# Patient Record
Sex: MALE | Race: WHITE
[De-identification: names, ages, dates, MRNs, and addresses within clinical notes are randomized per-mention and may not be internally consistent; named-entity substitution may affect disease eponyms.]

---

## 2019-11-13 ENCOUNTER — HOSPITAL ENCOUNTER (EMERGENCY)
Dept: HOSPITAL 95 - ER | Age: 69
Discharge: HOME | End: 2019-11-13
Payer: OTHER GOVERNMENT

## 2019-11-13 VITALS — WEIGHT: 159.84 LBS | BODY MASS INDEX: 25.09 KG/M2 | HEIGHT: 67 IN

## 2019-11-13 DIAGNOSIS — Z79.899: ICD-10-CM

## 2019-11-13 DIAGNOSIS — Z87.01: ICD-10-CM

## 2019-11-13 DIAGNOSIS — I25.10: ICD-10-CM

## 2019-11-13 DIAGNOSIS — I11.0: ICD-10-CM

## 2019-11-13 DIAGNOSIS — F17.210: ICD-10-CM

## 2019-11-13 DIAGNOSIS — G45.9: Primary | ICD-10-CM

## 2019-11-13 DIAGNOSIS — I50.32: ICD-10-CM

## 2019-11-13 LAB
ALBUMIN SERPL BCP-MCNC: 3.5 G/DL (ref 3.4–5)
ALBUMIN/GLOB SERPL: 1 {RATIO} (ref 0.8–1.8)
ALT SERPL W P-5'-P-CCNC: 48 U/L (ref 12–78)
ANION GAP SERPL CALCULATED.4IONS-SCNC: 6 MMOL/L (ref 6–16)
AST SERPL W P-5'-P-CCNC: 21 U/L (ref 12–37)
BASOPHILS # BLD AUTO: 0.05 K/MM3 (ref 0–0.23)
BASOPHILS NFR BLD AUTO: 1 % (ref 0–2)
BILIRUB SERPL-MCNC: 0.7 MG/DL (ref 0.1–1)
BUN SERPL-MCNC: 22 MG/DL (ref 8–24)
CALCIUM SERPL-MCNC: 9.4 MG/DL (ref 8.5–10.1)
CHLORIDE SERPL-SCNC: 112 MMOL/L (ref 98–108)
CO2 SERPL-SCNC: 23 MMOL/L (ref 21–32)
CREAT SERPL-MCNC: 1.14 MG/DL (ref 0.6–1.2)
DEPRECATED RDW RBC AUTO: 55.3 FL (ref 35.1–46.3)
EOSINOPHIL # BLD AUTO: 0.11 K/MM3 (ref 0–0.68)
EOSINOPHIL NFR BLD AUTO: 1 % (ref 0–6)
ERYTHROCYTE [DISTWIDTH] IN BLOOD BY AUTOMATED COUNT: 15.1 % (ref 11.7–14.2)
GLOBULIN SER CALC-MCNC: 3.6 G/DL (ref 2.2–4)
GLUCOSE SERPL-MCNC: 103 MG/DL (ref 70–99)
HCT VFR BLD AUTO: 44.5 % (ref 37–53)
HGB BLD-MCNC: 14.4 G/DL (ref 13.5–17.5)
IMM GRANULOCYTES # BLD AUTO: 0.03 K/MM3 (ref 0–0.1)
IMM GRANULOCYTES NFR BLD AUTO: 0 % (ref 0–1)
LYMPHOCYTES # BLD AUTO: 1.25 K/MM3 (ref 0.84–5.2)
LYMPHOCYTES NFR BLD AUTO: 12 % (ref 21–46)
MCHC RBC AUTO-ENTMCNC: 32.4 G/DL (ref 31.5–36.5)
MCV RBC AUTO: 99 FL (ref 80–100)
MONOCYTES # BLD AUTO: 1.18 K/MM3 (ref 0.16–1.47)
MONOCYTES NFR BLD AUTO: 11 % (ref 4–13)
NEUTROPHILS # BLD AUTO: 7.88 K/MM3 (ref 1.96–9.15)
NEUTROPHILS NFR BLD AUTO: 75 % (ref 41–73)
NRBC # BLD AUTO: 0 K/MM3 (ref 0–0.02)
NRBC BLD AUTO-RTO: 0 /100 WBC (ref 0–0.2)
PLATELET # BLD AUTO: 272 K/MM3 (ref 150–400)
POTASSIUM SERPL-SCNC: 4.5 MMOL/L (ref 3.5–5.5)
PROT SERPL-MCNC: 7.1 G/DL (ref 6.4–8.2)
PROTHROMBIN TIME: 11.4 SEC (ref 9.7–11.5)
SODIUM SERPL-SCNC: 141 MMOL/L (ref 136–145)

## 2020-01-02 ENCOUNTER — HOSPITAL ENCOUNTER (INPATIENT)
Dept: HOSPITAL 95 - ER | Age: 70
LOS: 3 days | Discharge: HOME | DRG: 310 | End: 2020-01-05
Attending: INTERNAL MEDICINE | Admitting: INTERNAL MEDICINE
Payer: OTHER GOVERNMENT

## 2020-01-02 VITALS — WEIGHT: 160.94 LBS | HEIGHT: 67.01 IN | BODY MASS INDEX: 25.26 KG/M2

## 2020-01-02 DIAGNOSIS — I48.19: Primary | ICD-10-CM

## 2020-01-02 DIAGNOSIS — Z95.810: ICD-10-CM

## 2020-01-02 DIAGNOSIS — Z79.82: ICD-10-CM

## 2020-01-02 DIAGNOSIS — Z95.5: ICD-10-CM

## 2020-01-02 DIAGNOSIS — I11.0: ICD-10-CM

## 2020-01-02 DIAGNOSIS — I25.5: ICD-10-CM

## 2020-01-02 DIAGNOSIS — E78.5: ICD-10-CM

## 2020-01-02 DIAGNOSIS — I50.9: ICD-10-CM

## 2020-01-02 DIAGNOSIS — I25.10: ICD-10-CM

## 2020-01-02 DIAGNOSIS — F17.210: ICD-10-CM

## 2020-01-02 DIAGNOSIS — J44.9: ICD-10-CM

## 2020-01-02 LAB
ALBUMIN SERPL BCP-MCNC: 4 G/DL (ref 3.4–5)
ALBUMIN/GLOB SERPL: 1 {RATIO} (ref 0.8–1.8)
ALT SERPL W P-5'-P-CCNC: 61 U/L (ref 12–78)
ANION GAP SERPL CALCULATED.4IONS-SCNC: 9 MMOL/L (ref 6–16)
AST SERPL W P-5'-P-CCNC: 30 U/L (ref 12–37)
BASOPHILS # BLD AUTO: 0.05 K/MM3 (ref 0–0.23)
BASOPHILS NFR BLD AUTO: 1 % (ref 0–2)
BILIRUB SERPL-MCNC: 1.1 MG/DL (ref 0.1–1)
BUN SERPL-MCNC: 36 MG/DL (ref 8–24)
CALCIUM SERPL-MCNC: 9.6 MG/DL (ref 8.5–10.1)
CHLORIDE SERPL-SCNC: 115 MMOL/L (ref 98–108)
CO2 SERPL-SCNC: 19 MMOL/L (ref 21–32)
CREAT SERPL-MCNC: 1.14 MG/DL (ref 0.6–1.2)
DEPRECATED RDW RBC AUTO: 49.7 FL (ref 35.1–46.3)
EOSINOPHIL # BLD AUTO: 0.15 K/MM3 (ref 0–0.68)
EOSINOPHIL NFR BLD AUTO: 1 % (ref 0–6)
ERYTHROCYTE [DISTWIDTH] IN BLOOD BY AUTOMATED COUNT: 14.1 % (ref 11.7–14.2)
GLOBULIN SER CALC-MCNC: 3.9 G/DL (ref 2.2–4)
GLUCOSE SERPL-MCNC: 85 MG/DL (ref 70–99)
HCT VFR BLD AUTO: 42.8 % (ref 37–53)
HGB BLD-MCNC: 13.9 G/DL (ref 13.5–17.5)
IMM GRANULOCYTES # BLD AUTO: 0.04 K/MM3 (ref 0–0.1)
IMM GRANULOCYTES NFR BLD AUTO: 0 % (ref 0–1)
LYMPHOCYTES # BLD AUTO: 1.71 K/MM3 (ref 0.84–5.2)
LYMPHOCYTES NFR BLD AUTO: 15 % (ref 21–46)
MAGNESIUM SERPL-MCNC: 2.2 MG/DL (ref 1.6–2.4)
MCHC RBC AUTO-ENTMCNC: 32.5 G/DL (ref 31.5–36.5)
MCV RBC AUTO: 96 FL (ref 80–100)
MONOCYTES # BLD AUTO: 1.49 K/MM3 (ref 0.16–1.47)
MONOCYTES NFR BLD AUTO: 13 % (ref 4–13)
NEUTROPHILS # BLD AUTO: 7.65 K/MM3 (ref 1.96–9.15)
NEUTROPHILS NFR BLD AUTO: 69 % (ref 41–73)
NRBC # BLD AUTO: 0 K/MM3 (ref 0–0.02)
NRBC BLD AUTO-RTO: 0 /100 WBC (ref 0–0.2)
PLATELET # BLD AUTO: 208 K/MM3 (ref 150–400)
POTASSIUM SERPL-SCNC: 4.8 MMOL/L (ref 3.5–5.5)
PROT SERPL-MCNC: 7.9 G/DL (ref 6.4–8.2)
PROTHROMBIN TIME: 26.7 SEC (ref 9.7–11.5)
SODIUM SERPL-SCNC: 143 MMOL/L (ref 136–145)
TSH SERPL DL<=0.005 MIU/L-ACNC: 1.86 UIU/ML (ref 0.36–4.8)
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
## 2020-01-03 LAB
ANION GAP SERPL CALCULATED.4IONS-SCNC: 8 MMOL/L (ref 6–16)
BASOPHILS # BLD AUTO: 0.04 K/MM3 (ref 0–0.23)
BASOPHILS NFR BLD AUTO: 0 % (ref 0–2)
BUN SERPL-MCNC: 42 MG/DL (ref 8–24)
CALCIUM SERPL-MCNC: 9.3 MG/DL (ref 8.5–10.1)
CHLORIDE SERPL-SCNC: 114 MMOL/L (ref 98–108)
CO2 SERPL-SCNC: 17 MMOL/L (ref 21–32)
CREAT SERPL-MCNC: 1.15 MG/DL (ref 0.6–1.2)
DEPRECATED RDW RBC AUTO: 49.3 FL (ref 35.1–46.3)
EOSINOPHIL # BLD AUTO: 0.15 K/MM3 (ref 0–0.68)
EOSINOPHIL NFR BLD AUTO: 1 % (ref 0–6)
ERYTHROCYTE [DISTWIDTH] IN BLOOD BY AUTOMATED COUNT: 14.2 % (ref 11.7–14.2)
GLUCOSE SERPL-MCNC: 102 MG/DL (ref 70–99)
HCT VFR BLD AUTO: 39.9 % (ref 37–53)
HGB BLD-MCNC: 12.9 G/DL (ref 13.5–17.5)
IMM GRANULOCYTES # BLD AUTO: 0.06 K/MM3 (ref 0–0.1)
IMM GRANULOCYTES NFR BLD AUTO: 1 % (ref 0–1)
LYMPHOCYTES # BLD AUTO: 1.66 K/MM3 (ref 0.84–5.2)
LYMPHOCYTES NFR BLD AUTO: 15 % (ref 21–46)
MCHC RBC AUTO-ENTMCNC: 32.3 G/DL (ref 31.5–36.5)
MCV RBC AUTO: 96 FL (ref 80–100)
MONOCYTES # BLD AUTO: 1.51 K/MM3 (ref 0.16–1.47)
MONOCYTES NFR BLD AUTO: 13 % (ref 4–13)
NEUTROPHILS # BLD AUTO: 7.98 K/MM3 (ref 1.96–9.15)
NEUTROPHILS NFR BLD AUTO: 70 % (ref 41–73)
NRBC # BLD AUTO: 0 K/MM3 (ref 0–0.02)
NRBC BLD AUTO-RTO: 0 /100 WBC (ref 0–0.2)
PLATELET # BLD AUTO: 205 K/MM3 (ref 150–400)
POTASSIUM SERPL-SCNC: 4.9 MMOL/L (ref 3.5–5.5)
PROTHROMBIN TIME: 32.5 SEC (ref 9.7–11.5)
SODIUM SERPL-SCNC: 139 MMOL/L (ref 136–145)

## 2020-01-03 PROCEDURE — 5A2204Z RESTORATION OF CARDIAC RHYTHM, SINGLE: ICD-10-PCS

## 2020-01-03 NOTE — NUR
CALL TO MD / LOOSE STOOLS
 
CALL TO MD STRONG TO REPORT LOOSE/LIQUIDY, BROWN STOOLS THIS SHIFT. MD BERRY/ ORDER
FOR GI PANEL TO BE COLLECTED.

## 2020-01-03 NOTE — NUR
SHIFT SUMMARY,
 
PT HAD CARDIOVERSION AT APROX 1400. PT CONVERTED TO SNR AND HAS STAYED SO FAR.
PER DR. MOCK THE AMNIO GTT IS TO CONTINUE TO RUN UNTIL HE SEES THE PT IN
THE AM. PT TOLERATED CARDIOVERSION WELL. PT HAS BEEN HYPOTENSIVE, PROVIDER IS
AWARE. PT'S OTHER VS HAVE BEEN STABLE.
 
CALL LIGHT IN REACH, WILL CONTINUE TO MONITOR UNTIL REPORT IS GIVEN TO
ONCOMING RN.
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
## 2020-01-03 NOTE — NUR
AM NOTE...
 
ASSUMED CARE OF PT APROX 0700. PT IS A&Ox4 AND IND IN THE ROOM. PT WAS
ADMITTED FOR AFIB RVR. PT C/O OF SOB ON ADMIT, HOWEVER PT STATES THIS HAS
IMPROVED GREATLY. CURRENTLY PT IS IN AFIB . PT IS ON AMNIO GTT THAT WILL
END AT 1625. L/S CLEAR T/O DIM IN THE BASES. BT PRESENT AND NOMROACTIVE. NO
EDEMA IS NOTED ON ASSESSMENT.  VS STABLE.
 
CALL LIGHT IN REACH, WILL CONTINUE TO MONITOR.

## 2020-01-03 NOTE — NUR
SOCIAL SERVICE CONSULT / NO TEETH
 
SOCIAL SERVICE CONSULT PLACED D/T PT HAVING NO TEETH AND REPORTS NO DENTAL
COVERAGE FOR DENTURES. PT REPORTS SOMETIMES GETTING A SORE MOUTH WHEN
ATTEMPTING TO EAT SOME SOLID FOODS.

## 2020-01-03 NOTE — NUR
Advance directive education/spiritual care visit conducted. Upon receiving an
admit referral for advance directive education, I visited patient. Patient
expresses interest so I give patient the advance directive booklet and go
through the sections, discuss the importance and process and explain how to
file. Patient voices understanding and states that he will go through the
booklet on his own time.
We then talk about patient's spiritual background, his sources of inspiration,
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
I listen empathically, conduct a spiritual assessment, reinforce helpful
attitudes and practices and provide companionship, pastoral  and
prayer. Patient responds well and shows signs of restored andree. I will
continue to remain available to patient and family.

## 2020-01-03 NOTE — NUR
LOOSE STOOL
 
PT W/ 2 REPORTED LOOSE BM's SINCE ADMIT TO PCU THIS SHIFT. PT REPORTS STOOL AS
BEING "BROWN, KIND OF FORMED, KIND OF NOT. KIND OF LOOSE, KIND OF LIQUIDY."
ONE STOOL ASSESSED AS BEING LOOSE, BROWN STOOL W/ CHUNKS OF UNDIGESTED CORN.
 
PT REPORTS HAVING NO TEETH AND NOT HAVING THE ABILITY TO GET DENTURES D/T LACK
[facility]RU [facility]. PT STATES "I DO THE BEST I CAN" W/ CHEWING & EATING.
 
WILL CONTINUE TO MONITOR AND PROVIDE CARE.

## 2020-01-04 LAB — PROTHROMBIN TIME: 39.7 SEC (ref 9.7–11.5)

## 2020-01-04 NOTE — NUR
SHIFT SUMMARY
NO ACUTE CHANGES SINCE INITIAL ASSESSMENT, PATIENT DENIES PAIN AND WAS
COMPLIANT AND COOPERATIVE WITH ALL INTERVENTIONS. PATIENT CONTINUES TO EXHIBIT
HYPOTENSION WITH BP'S IN 80'S OVER 50'S, BUT MAP IS CONSISTENTLY OVER 70.
AMIODARONE DRIP HAS BEEN INFUSING PER ORDER AT 16.6 ML/HR PER ORDER OF DR MOCK, TO CONTINUE UNTIL HE SEES THE PATIENT EARLY THIS MORNING. HEART RATE
CONTINUES TO BE PACED AT 60 BPM, PER MONITOR. WILL CONTINUE TO MONITOR AND
WILL PASS CARE AND REPORT TO ONCOMING SHIFT AT 0700. PATIENT SLEEPING IN SHORT
STINTS AS HE REPORTS HE IS NOT USED TO SLEEPING IN HOSPITAL. BED IS LOCKED AND
LOW, CALL LIGHT W/IN REACH

## 2020-01-04 NOTE — NUR
ASSUMED CARE OF PATIENT AT 1900 W/ REPORT FROM GLADYS PIKE, PATIENT AWAKE AND
ALERT SITTING SIDE OF BED IN NO OBVIOUS DISTRESS. ALL VSS AND WNL, HEART RATE
PACED AT 60 PER MONITOR TECH. LUNG SOUNDS COARSE WITH GOOD AIR MOVEMENT. AT
ASSESSMENT PATIENT ADMITTED CHRONIC PAIN TO BACK AND NECK AT 8/10. AS THERE IS
NO PAIN MEDICATION IN PT'S EMAR, WILL CALL HOSPITALIST TO OBTAIN ORDER FOR
TRAMADOL WHICH WORKED FOR THE PATIENT THE NIGHT PREVIOUS. WILL CONTINUE TO
MONITOR AND PROVIDE CARE.

## 2020-01-04 NOTE — NUR
CALLED HOSPITALIST AT PT REQUEST FOR PAIN MEDICATION AND OBTAINED ONE-TIME
ORDER FOR TRAMADOL 100MG, EQUAL TO WHAT PATIENT HAD THE NIGHT PREVIOUS. WILL
ADMINISTER AND CONTINUE TO ASSESS AND MONITOR

## 2020-01-04 NOTE — NUR
PT HAS BEEN RESTING WELL IN BED T/O THE DAY. DENIES CP OR SOB. PT HAS BEEN
TAKEN OFF OF AMIODERONE DRIP AND PLACED ON PO. SLIGHT HYPOTENSION IS STILL
NOTED BUT HAS NOT CHANGED FROM LAST NOC. PT A/O X ANSWERING QUESTIONS
APPROPRIATELY IN FULL SENTENCES. PT HAS BEEN INDEPENDANT IN ROM

## 2020-01-04 NOTE — NUR
ASSUMED CARE OF PATIENT AT 1900 WITH REPORT FROM DAY NURSE, PATIENT LYING IN
BED EYES CLOSED BREATHING DEEPLY. AT ASSESSMENT PATIENT IS ALERT AND
COOPERATIVE, DENIES ACUTE DISCOMFORT BUT C/O CHRONIC NECK/BACK PAIN. PATIENT
HYPOTENSIVE CONSISTENT W/ REPORT, AND ASYMPTOMATIC. ALL OTHER VSS AND WNL.
SATURATING 94% ON ROOM AIR, LUNGS CLEAR, HR PACED PER MONITOR AT 60. WILL
CONTINUE TO MONITOR, CALL LIGHT W/IN REACH.

## 2020-01-05 LAB — PROTHROMBIN TIME: 27.1 SEC (ref 9.7–11.5)

## 2020-01-05 NOTE — NUR
PT D/C
 
PT D/C HOME, MEDCIATIONS CALLED TO PHARMACY OF CHOICE, D/C EDUCATION PROVIDED.
IV WAS REMOVED WNL. ALL OF PT'S BELONGINGS PACKED AND SENT WITH PT.

## 2020-01-05 NOTE — NUR
AM NOTE...
 
ASSUMED CARE OF PT APROX 0700. PT IS A&Ox4 AND IND IN THE ROOM. PT IS S/P
CARDIOVERSION FROM AFIB TO NSR/PACED. PT IS HYPOTENSIVE AT 87/55, PT IS NOT
SYMPTOMATIC AT THIS TIME. CARDIOLOGY IS AWARE. PT'S OTHER VS STABLE. PT DENIES
CHEST PAIN/PRESSURE N/V OR SOB.
 
WILL CONTINUE TO MONITOR.

## 2020-01-05 NOTE — NUR
SHIFT SUMMARY
NO ACUTE CHANGES FROM INITIAL NOTE. PATIENT WAS MEDICATED AND TREATED PER EMAR
AND UNIT PROTOCOL WITH NO SIGNIFICANT ISSUES. PATIENT REMAINS HYPTOTENSIVE AS
BASELINE, AND HEART RATE REMAINED NEAR 60 BPM THROUGHOUT SHIFT.
PATIENT WAS UP AMBULATING IN THE HALLS TWICE THIS SHIFT, WHICH HE TOLERATED
WELL, EXHIBITING ONLY MILD SOB WHEN RETURNING TO ROOM. OTHERWISE PT ALTERNATED
BETWEEN WATCHING TV AND PERIODS OF RESTING/SLEEPING IN BED, AND WAS COMPLIANT
WITH ALL INTERVENTIONS. WILL CONTINUE TO MONITOR UNTIL PASSING CARE AND REPORT
TO ONCOMING SHIFT. BED IS LOCKED AND LOW, CALL LIGHT W/IN REACH

## 2020-09-11 ENCOUNTER — HOSPITAL ENCOUNTER (INPATIENT)
Dept: HOSPITAL 95 - ER | Age: 70
LOS: 7 days | Discharge: HOME HEALTH | DRG: 280 | End: 2020-09-18
Attending: INTERNAL MEDICINE | Admitting: HOSPITALIST
Payer: OTHER GOVERNMENT

## 2020-09-11 VITALS — WEIGHT: 147.49 LBS | BODY MASS INDEX: 23.7 KG/M2 | HEIGHT: 65.98 IN

## 2020-09-11 DIAGNOSIS — J44.1: ICD-10-CM

## 2020-09-11 DIAGNOSIS — N18.2: ICD-10-CM

## 2020-09-11 DIAGNOSIS — I25.5: ICD-10-CM

## 2020-09-11 DIAGNOSIS — N17.9: ICD-10-CM

## 2020-09-11 DIAGNOSIS — I13.0: Primary | ICD-10-CM

## 2020-09-11 DIAGNOSIS — I48.0: ICD-10-CM

## 2020-09-11 DIAGNOSIS — I21.A1: ICD-10-CM

## 2020-09-11 DIAGNOSIS — K21.9: ICD-10-CM

## 2020-09-11 DIAGNOSIS — F17.213: ICD-10-CM

## 2020-09-11 DIAGNOSIS — F17.210: ICD-10-CM

## 2020-09-11 DIAGNOSIS — I50.43: ICD-10-CM

## 2020-09-11 DIAGNOSIS — Z95.810: ICD-10-CM

## 2020-09-11 DIAGNOSIS — Z86.73: ICD-10-CM

## 2020-09-11 DIAGNOSIS — M19.90: ICD-10-CM

## 2020-09-11 DIAGNOSIS — Z79.82: ICD-10-CM

## 2020-09-11 DIAGNOSIS — E78.5: ICD-10-CM

## 2020-09-11 DIAGNOSIS — E87.6: ICD-10-CM

## 2020-09-11 DIAGNOSIS — I95.9: ICD-10-CM

## 2020-09-11 DIAGNOSIS — I25.10: ICD-10-CM

## 2020-09-11 LAB
ALBUMIN SERPL BCP-MCNC: 3.2 G/DL (ref 3.4–5)
ALBUMIN/GLOB SERPL: 0.8 {RATIO} (ref 0.8–1.8)
ALT SERPL W P-5'-P-CCNC: 12 U/L (ref 12–78)
ANION GAP SERPL CALCULATED.4IONS-SCNC: 9 MMOL/L (ref 6–16)
AST SERPL W P-5'-P-CCNC: 14 U/L (ref 12–37)
BASOPHILS # BLD AUTO: 0.07 K/MM3 (ref 0–0.23)
BASOPHILS NFR BLD AUTO: 1 % (ref 0–2)
BILIRUB SERPL-MCNC: 0.8 MG/DL (ref 0.1–1)
BUN SERPL-MCNC: 20 MG/DL (ref 8–24)
CALCIUM SERPL-MCNC: 8.8 MG/DL (ref 8.5–10.1)
CHLORIDE SERPL-SCNC: 110 MMOL/L (ref 98–108)
CO2 SERPL-SCNC: 23 MMOL/L (ref 21–32)
CREAT SERPL-MCNC: 1.21 MG/DL (ref 0.6–1.2)
DEPRECATED RDW RBC AUTO: 50.9 FL (ref 35.1–46.3)
EOSINOPHIL # BLD AUTO: 0.09 K/MM3 (ref 0–0.68)
EOSINOPHIL NFR BLD AUTO: 1 % (ref 0–6)
ERYTHROCYTE [DISTWIDTH] IN BLOOD BY AUTOMATED COUNT: 14.3 % (ref 11.7–14.2)
GLOBULIN SER CALC-MCNC: 3.8 G/DL (ref 2.2–4)
GLUCOSE SERPL-MCNC: 103 MG/DL (ref 70–99)
HCT VFR BLD AUTO: 39.3 % (ref 37–53)
HGB BLD-MCNC: 12.4 G/DL (ref 13.5–17.5)
IMM GRANULOCYTES # BLD AUTO: 0.05 K/MM3 (ref 0–0.1)
IMM GRANULOCYTES NFR BLD AUTO: 0 % (ref 0–1)
LEUKOCYTE ESTERASE UR QL STRIP: (no result)
LYMPHOCYTES # BLD AUTO: 1.21 K/MM3 (ref 0.84–5.2)
LYMPHOCYTES NFR BLD AUTO: 10 % (ref 21–46)
MCHC RBC AUTO-ENTMCNC: 31.6 G/DL (ref 31.5–36.5)
MCV RBC AUTO: 97 FL (ref 80–100)
MONOCYTES # BLD AUTO: 1.19 K/MM3 (ref 0.16–1.47)
MONOCYTES NFR BLD AUTO: 10 % (ref 4–13)
NEUTROPHILS # BLD AUTO: 9.34 K/MM3 (ref 1.96–9.15)
NEUTROPHILS NFR BLD AUTO: 78 % (ref 41–73)
NRBC # BLD AUTO: 0 K/MM3 (ref 0–0.02)
NRBC BLD AUTO-RTO: 0 /100 WBC (ref 0–0.2)
PLATELET # BLD AUTO: 181 K/MM3 (ref 150–400)
POTASSIUM SERPL-SCNC: 3.5 MMOL/L (ref 3.5–5.5)
PROT SERPL-MCNC: 7 G/DL (ref 6.4–8.2)
PROT UR STRIP-MCNC: (no result) MG/DL
PROTHROMBIN TIME: 11.3 SEC (ref 9.7–11.5)
RBC #/AREA URNS HPF: (no result) /HPF (ref 0–2)
SODIUM SERPL-SCNC: 142 MMOL/L (ref 136–145)
SP GR SPEC: 1.01 (ref 1–1.02)
TROPONIN I SERPL-MCNC: 0.05 NG/ML (ref 0–0.04)
TSH SERPL DL<=0.005 MIU/L-ACNC: 1.3 UIU/ML (ref 0.36–4.8)
UROBILINOGEN UR STRIP-MCNC: (no result) MG/DL
WBC #/AREA URNS HPF: (no result) /HPF (ref 0–5)

## 2020-09-11 PROCEDURE — G0378 HOSPITAL OBSERVATION PER HR: HCPCS

## 2020-09-11 PROCEDURE — A9270 NON-COVERED ITEM OR SERVICE: HCPCS

## 2020-09-11 PROCEDURE — C9113 INJ PANTOPRAZOLE SODIUM, VIA: HCPCS

## 2020-09-12 LAB
ALBUMIN SERPL BCP-MCNC: 3.3 G/DL (ref 3.4–5)
ALBUMIN/GLOB SERPL: 0.9 {RATIO} (ref 0.8–1.8)
ALT SERPL W P-5'-P-CCNC: 15 U/L (ref 12–78)
ANION GAP SERPL CALCULATED.4IONS-SCNC: 8 MMOL/L (ref 6–16)
AST SERPL W P-5'-P-CCNC: 12 U/L (ref 12–37)
BASOPHILS # BLD AUTO: 0.07 K/MM3 (ref 0–0.23)
BASOPHILS NFR BLD AUTO: 1 % (ref 0–2)
BILIRUB SERPL-MCNC: 0.9 MG/DL (ref 0.1–1)
BUN SERPL-MCNC: 22 MG/DL (ref 8–24)
CALCIUM SERPL-MCNC: 9.1 MG/DL (ref 8.5–10.1)
CHLORIDE SERPL-SCNC: 109 MMOL/L (ref 98–108)
CO2 SERPL-SCNC: 24 MMOL/L (ref 21–32)
CREAT SERPL-MCNC: 1.4 MG/DL (ref 0.6–1.2)
DEPRECATED RDW RBC AUTO: 49.7 FL (ref 35.1–46.3)
EOSINOPHIL # BLD AUTO: 0.19 K/MM3 (ref 0–0.68)
EOSINOPHIL NFR BLD AUTO: 2 % (ref 0–6)
ERYTHROCYTE [DISTWIDTH] IN BLOOD BY AUTOMATED COUNT: 14.3 % (ref 11.7–14.2)
GLOBULIN SER CALC-MCNC: 3.6 G/DL (ref 2.2–4)
GLUCOSE SERPL-MCNC: 121 MG/DL (ref 70–99)
HCT VFR BLD AUTO: 40.2 % (ref 37–53)
HGB BLD-MCNC: 12.9 G/DL (ref 13.5–17.5)
IMM GRANULOCYTES # BLD AUTO: 0.01 K/MM3 (ref 0–0.1)
IMM GRANULOCYTES NFR BLD AUTO: 0 % (ref 0–1)
LYMPHOCYTES # BLD AUTO: 1.92 K/MM3 (ref 0.84–5.2)
LYMPHOCYTES NFR BLD AUTO: 24 % (ref 21–46)
MCHC RBC AUTO-ENTMCNC: 32.1 G/DL (ref 31.5–36.5)
MCV RBC AUTO: 96 FL (ref 80–100)
MONOCYTES # BLD AUTO: 0.55 K/MM3 (ref 0.16–1.47)
MONOCYTES NFR BLD AUTO: 7 % (ref 4–13)
NEUTROPHILS # BLD AUTO: 5.41 K/MM3 (ref 1.96–9.15)
NEUTROPHILS NFR BLD AUTO: 66 % (ref 41–73)
NRBC # BLD AUTO: 0 K/MM3 (ref 0–0.02)
NRBC BLD AUTO-RTO: 0 /100 WBC (ref 0–0.2)
PLATELET # BLD AUTO: 209 K/MM3 (ref 150–400)
POTASSIUM SERPL-SCNC: 2.8 MMOL/L (ref 3.5–5.5)
PROT SERPL-MCNC: 6.9 G/DL (ref 6.4–8.2)
SODIUM SERPL-SCNC: 141 MMOL/L (ref 136–145)

## 2020-09-12 NOTE — NUR
PT SUMMARY:
PT ALERT AND ORIENTED, VITALS HRR PACED AT 70'S, BP SYSTOLIC 90'S-100'S MAP 77
SATS ABOVE 95% ON ROOMAIR, AFEBRILE. DENIES CHEST PAIN/PRESSURE. TROPONIN
TRENDS LAST WAS 0.7 CARDIOLOGIST CONSULTED. NEW MEDICATIONS ORDERED PT TO
START ON XARELTO FOR AFIB. NO ACUTE CHANGE REPORTED FOR THE SHIFT, PT
AMBULATED AROUNF THE UNIT WITH NO ISSUES, WORKED WITH THERAPY AS WELL.
POTASSIUM REPLACED GIVEN 60MEQ TOTAL PO TODAY TO REPEAT LAB THIS AFTERNOON.
PT IN BED RESTING, ABLE TO MAKE NEEDS KNOWN, CALL LIGHTS WITHIN REACH WILL
MONITOR

## 2020-09-12 NOTE — NUR
LOW BLOOD PRESSURE
 
PT FOUND TO HAVE A LOW BP. PT DENIES LIGHT HEADEDNESS, DIZZINESS.  PT IS
ASYMPTOMATIC. MAP >60. WILL CONTINUE TO MONITOR.

## 2020-09-13 LAB
ANION GAP SERPL CALCULATED.4IONS-SCNC: 5 MMOL/L (ref 6–16)
BUN SERPL-MCNC: 34 MG/DL (ref 8–24)
CALCIUM SERPL-MCNC: 9 MG/DL (ref 8.5–10.1)
CHLORIDE SERPL-SCNC: 111 MMOL/L (ref 98–108)
CO2 SERPL-SCNC: 24 MMOL/L (ref 21–32)
CREAT SERPL-MCNC: 1.59 MG/DL (ref 0.6–1.2)
GLUCOSE SERPL-MCNC: 81 MG/DL (ref 70–99)
MAGNESIUM SERPL-MCNC: 2.1 MG/DL (ref 1.6–2.4)
POTASSIUM SERPL-SCNC: 4 MMOL/L (ref 3.5–5.5)
SODIUM SERPL-SCNC: 140 MMOL/L (ref 136–145)

## 2020-09-13 NOTE — NUR
PT TRANSFERREED TO ROOM FROM PCU AT 1445.  PLEASANT COOP . SETTLED TO BED.
REPORT FROM ELEANOR PIKE.

## 2020-09-13 NOTE — NUR
PT HRR WENT UP 'S-140'S PACED, DR MOLINA AWARE ORDERED DIG LOAD, BP
SYSTOLIC 115'S-120'S, SATS ABOVE 98% ON ROOMAIR, AFEBRILE, PT DENIES ANY CHEST
PAIN/PALPITATIONS. O.5MG DIGOXIN ADMINISTERED IN 50MLS NS HR RANGING 'S
POST ADMINISTRATION. NO OTHER ISSUES ENCOUNTERED. PT TRANSFERRED TO MEDICAL
FLOOR. REPORT GIVEN TO DAREN PIKE, ALL BELONGINGS SENT WITH PT.

## 2020-09-13 NOTE — NUR
PT PLEASANT SINCE TRANSFER AT 1445. NO CONCERNS AT THIS TIME. WILL BE RUNNING
DIGOXIN SHORTLY. PT AMBULATES SBA IN ROOM. BED INLOW  POSITIOIN, CALL LITE IN
REACH, CALLS APPROP

## 2020-09-13 NOTE — NUR
ASSUME CARE:
PT HRR REMAINED PACED ON THE 70'S, BP SYSTOLIC 100'S PT WAS ABLE TO TAKE HIS
COREG, AND DIURETICS BP WAS RECHECKED AFTER AN HOUR OF MEDS RECEIVED BP WAS AT
97/77, PT DID C/O OF MILD DIZZINESS AFTER AMBULATION WAS ABLE TO AMBULATE IN
THE UNIT. PT HAD 700MLS URINE OUTPUT SINCE THE BEGINNING OF THE SHIFT. ALSO
STARTED TO DEVELOP COUGH AND BROWN PHLEGM, MUCINEX ORDERED. NO OTHER ISSUES
ENCOUNTERED FOR THE SHIFT, STATUS CHANGED TO MEDICAL WITH TELE. WILL MONITOR
PT

## 2020-09-13 NOTE — NUR
END OF SHIFT SUMMARY
 
NO ACUTE CHANGES THIS SHIFT. PT IS A&O X4. SP02>92% ON RA.
RESPIRATORY THERAPY ASSISTED PT WITH INHALOR AT THE BEGINNING OF
SHIFT. C/O SOB UPON EXERTION. OCCASIONAL NON PRODUCTIVE COUGH. AICD IN LEFT
CHEST WALL. DENIES CP/PRESSURE.  PT % VENTRICULARLY PACED HR 70'S. HAD
ONE LOW BP THIS SHIFT, BP NOW IMPROVED. PT INDEPENDENT IN ROOM.  SHOWERED THIS
SHIFT AND SLEPT T/O THE NIGHT. CALL LIGHT IN REACH. WILL CONTINUE TO MONITOR
UNTIL END OF SHIFT.

## 2020-09-14 LAB
ALBUMIN SERPL BCP-MCNC: 3 G/DL (ref 3.4–5)
ALBUMIN/GLOB SERPL: 0.8 {RATIO} (ref 0.8–1.8)
ALT SERPL W P-5'-P-CCNC: 13 U/L (ref 12–78)
ANION GAP SERPL CALCULATED.4IONS-SCNC: 8 MMOL/L (ref 6–16)
AST SERPL W P-5'-P-CCNC: 11 U/L (ref 12–37)
BASOPHILS # BLD AUTO: 0.04 K/MM3 (ref 0–0.23)
BASOPHILS NFR BLD AUTO: 0 % (ref 0–2)
BILIRUB SERPL-MCNC: 0.4 MG/DL (ref 0.1–1)
BUN SERPL-MCNC: 31 MG/DL (ref 8–24)
CALCIUM SERPL-MCNC: 9.3 MG/DL (ref 8.5–10.1)
CHLORIDE SERPL-SCNC: 108 MMOL/L (ref 98–108)
CO2 SERPL-SCNC: 24 MMOL/L (ref 21–32)
CREAT SERPL-MCNC: 1.45 MG/DL (ref 0.6–1.2)
DEPRECATED RDW RBC AUTO: 49.1 FL (ref 35.1–46.3)
EOSINOPHIL # BLD AUTO: 0.16 K/MM3 (ref 0–0.68)
EOSINOPHIL NFR BLD AUTO: 1 % (ref 0–6)
ERYTHROCYTE [DISTWIDTH] IN BLOOD BY AUTOMATED COUNT: 14.1 % (ref 11.7–14.2)
GLOBULIN SER CALC-MCNC: 3.6 G/DL (ref 2.2–4)
GLUCOSE SERPL-MCNC: 83 MG/DL (ref 70–99)
HCT VFR BLD AUTO: 37.1 % (ref 37–53)
HGB BLD-MCNC: 11.8 G/DL (ref 13.5–17.5)
IMM GRANULOCYTES # BLD AUTO: 0.03 K/MM3 (ref 0–0.1)
IMM GRANULOCYTES NFR BLD AUTO: 0 % (ref 0–1)
LYMPHOCYTES # BLD AUTO: 1.51 K/MM3 (ref 0.84–5.2)
LYMPHOCYTES NFR BLD AUTO: 14 % (ref 21–46)
MAGNESIUM SERPL-MCNC: 2.2 MG/DL (ref 1.6–2.4)
MCHC RBC AUTO-ENTMCNC: 31.8 G/DL (ref 31.5–36.5)
MCV RBC AUTO: 95 FL (ref 80–100)
MONOCYTES # BLD AUTO: 1.44 K/MM3 (ref 0.16–1.47)
MONOCYTES NFR BLD AUTO: 13 % (ref 4–13)
NEUTROPHILS # BLD AUTO: 8 K/MM3 (ref 1.96–9.15)
NEUTROPHILS NFR BLD AUTO: 72 % (ref 41–73)
NRBC # BLD AUTO: 0 K/MM3 (ref 0–0.02)
NRBC BLD AUTO-RTO: 0 /100 WBC (ref 0–0.2)
PLATELET # BLD AUTO: 214 K/MM3 (ref 150–400)
POTASSIUM SERPL-SCNC: 4 MMOL/L (ref 3.5–5.5)
PROT SERPL-MCNC: 6.6 G/DL (ref 6.4–8.2)
SODIUM SERPL-SCNC: 140 MMOL/L (ref 136–145)

## 2020-09-14 NOTE — NUR
ALERT. ORIENTED. INDEPENDENT IN ROOM. HEART RATE JUMPS UP -150'S WHEN
AMBULATORY IN HALLWAY WITH  AWARE. HAS DENIED ; C.P., N/V OR SOB. ON
TELE. TM

## 2020-09-14 NOTE — NUR
NOTIFIED THAT WHEN PATIENT IS WALKING; SUCH AS, IN HALLWAY WITH P.T.,
HIS HEART RATE JUMPS UP 'S. AFTER FEW MINUTES IN ROOM HEART RATE GOES
BACK DOWN TO 70-80'S AND IS PACED. WCTM.

## 2020-09-14 NOTE — NUR
SHIFT SUMMARY
PT IS A 69 Y/O MALE, ADMITTED FOR ACUTE ON CHRONIC CHF. HE IS A&O X 4,
INDEPENDENT IN THE ROOM. NO COMPLAINTS OF ACUTE PAIN, NAUSEA OR SOB. VITAL
SIGNS STABLE. PT RECEIVED SECOND IV DIGOXIN DOSE AT HS. PT SLEPT WELL THROUGH
THE NIGHT. NO ACUTE CHANGES IN PT CONDITION NOTED. WILL CONTINUE TO MONITOR
AND TREAT PER EMAR UNTIL HAND OFF TO DAY SHIFT RN.

## 2020-09-15 LAB
ALBUMIN SERPL BCP-MCNC: 3.4 G/DL (ref 3.4–5)
ALBUMIN/GLOB SERPL: 0.9 {RATIO} (ref 0.8–1.8)
ALT SERPL W P-5'-P-CCNC: 21 U/L (ref 12–78)
ANION GAP SERPL CALCULATED.4IONS-SCNC: 7 MMOL/L (ref 6–16)
AST SERPL W P-5'-P-CCNC: 14 U/L (ref 12–37)
BASOPHILS # BLD AUTO: 0.04 K/MM3 (ref 0–0.23)
BASOPHILS NFR BLD AUTO: 0 % (ref 0–2)
BILIRUB SERPL-MCNC: 0.5 MG/DL (ref 0.1–1)
BUN SERPL-MCNC: 39 MG/DL (ref 8–24)
CALCIUM SERPL-MCNC: 9.7 MG/DL (ref 8.5–10.1)
CHLORIDE SERPL-SCNC: 105 MMOL/L (ref 98–108)
CO2 SERPL-SCNC: 25 MMOL/L (ref 21–32)
CREAT SERPL-MCNC: 1.9 MG/DL (ref 0.6–1.2)
DEPRECATED RDW RBC AUTO: 48.5 FL (ref 35.1–46.3)
EOSINOPHIL # BLD AUTO: 0.12 K/MM3 (ref 0–0.68)
EOSINOPHIL NFR BLD AUTO: 1 % (ref 0–6)
ERYTHROCYTE [DISTWIDTH] IN BLOOD BY AUTOMATED COUNT: 14 % (ref 11.7–14.2)
GLOBULIN SER CALC-MCNC: 3.9 G/DL (ref 2.2–4)
GLUCOSE SERPL-MCNC: 97 MG/DL (ref 70–99)
HCT VFR BLD AUTO: 40.3 % (ref 37–53)
HGB BLD-MCNC: 12.6 G/DL (ref 13.5–17.5)
IMM GRANULOCYTES # BLD AUTO: 0.02 K/MM3 (ref 0–0.1)
IMM GRANULOCYTES NFR BLD AUTO: 0 % (ref 0–1)
LYMPHOCYTES # BLD AUTO: 1.74 K/MM3 (ref 0.84–5.2)
LYMPHOCYTES NFR BLD AUTO: 17 % (ref 21–46)
MAGNESIUM SERPL-MCNC: 2.1 MG/DL (ref 1.6–2.4)
MCHC RBC AUTO-ENTMCNC: 31.3 G/DL (ref 31.5–36.5)
MCV RBC AUTO: 95 FL (ref 80–100)
MONOCYTES # BLD AUTO: 1.44 K/MM3 (ref 0.16–1.47)
MONOCYTES NFR BLD AUTO: 14 % (ref 4–13)
NEUTROPHILS # BLD AUTO: 7.19 K/MM3 (ref 1.96–9.15)
NEUTROPHILS NFR BLD AUTO: 68 % (ref 41–73)
NRBC # BLD AUTO: 0 K/MM3 (ref 0–0.02)
NRBC BLD AUTO-RTO: 0 /100 WBC (ref 0–0.2)
PLATELET # BLD AUTO: 241 K/MM3 (ref 150–400)
POTASSIUM SERPL-SCNC: 3.9 MMOL/L (ref 3.5–5.5)
PROT SERPL-MCNC: 7.3 G/DL (ref 6.4–8.2)
SODIUM SERPL-SCNC: 137 MMOL/L (ref 136–145)

## 2020-09-15 NOTE — NUR
NIGHT SHIFT SUMMARY
NO ACUTE CHANGES THIS SHIFT. PT AAOX4 AND INDEPENDENT IN ROOM. DENIES PAIN,
SOB, N/V. PT REPORTS BREATHING IS GREATLY IMPROVED COMPARED TO WHEN HE WAS
FIRST ADMITTED. NO ACUTE CHANGES ON TELE. PT POSSIBLE DC HOME LATER TODAY.
VSS, WILL CONTINUE TO MONITOR.

## 2020-09-15 NOTE — NUR
pt. is in bed resting he reports doing much better encouraged pt.offered
prayers ands spiritual supports

## 2020-09-15 NOTE — NUR
SHIFT SUMMARY
PT REPORTING FEELING VERY DIZZY THIS AFTERNOON.  BP WAS ABP WAS 81.  SPOKE
WITH MD WITH FLUID BOLUS ORDERED.  SINCE COMPLETED HAS BEEN ABLE TO MAINTAIN
BP IN THE 90'S.  DOES CONTINUE TO REPORT FEELING DIZZY.  ENCOURAGED TO CALL
FOR ASSISTANCE TO GET TO BATHROOM.

## 2020-09-16 LAB
ANION GAP SERPL CALCULATED.4IONS-SCNC: 7 MMOL/L (ref 6–16)
BUN SERPL-MCNC: 48 MG/DL (ref 8–24)
CALCIUM SERPL-MCNC: 9.4 MG/DL (ref 8.5–10.1)
CHLORIDE SERPL-SCNC: 103 MMOL/L (ref 98–108)
CO2 SERPL-SCNC: 26 MMOL/L (ref 21–32)
CREAT SERPL-MCNC: 2.09 MG/DL (ref 0.6–1.2)
GLUCOSE SERPL-MCNC: 89 MG/DL (ref 70–99)
POTASSIUM SERPL-SCNC: 4.2 MMOL/L (ref 3.5–5.5)
SODIUM SERPL-SCNC: 136 MMOL/L (ref 136–145)

## 2020-09-16 NOTE — NUR
PT BP IS PERSISTANTLY LOW DESPITE FLUID BOLUS YESTERDAY AND NO FURTHE
ANTIHYPERTENSIVES.  ALSO REPORTS CONTINUED DIZZINESS WHICH WORSENS WITH
AMBULATING.  NEW ORDER FOR MIDODRINE GIVEN.  WILL MONITER EFFECTIVENESS

## 2020-09-16 NOTE — NUR
SHIFT SUMMARY
UP TO BATHROOM WITH FWW.  SITTING IN CHAIR FOR LUNCH.  BP IMPROVING AFTER
FLUID BOLUS AND MIDODRINE STARTED.  MORE CHATTY THIS EVENING THAN HE HAS BEEN.
POTENTIAL DISCHARGE TOMORROW.

## 2020-09-16 NOTE — NUR
NIGHT SHIFT SUMMARY
NO ACUTE CHANGES. PT AAOX4 AND PLEASANT. STANDBY ASSIST TO BATHROOM. PT STILL
REPORTS SOME MILD DIZZINESS AT TIMES WITH AMBULATION. SBP 91 WITH MORNING
VITALS. PT DENIES PAIN, SOB, N/V. VSS, WILL CONTINUE TO MONITOR.

## 2020-09-17 LAB
ALBUMIN SERPL BCP-MCNC: 3.2 G/DL (ref 3.4–5)
ALBUMIN/GLOB SERPL: 0.9 {RATIO} (ref 0.8–1.8)
ALT SERPL W P-5'-P-CCNC: 18 U/L (ref 12–78)
ANION GAP SERPL CALCULATED.4IONS-SCNC: 7 MMOL/L (ref 6–16)
AST SERPL W P-5'-P-CCNC: 14 U/L (ref 12–37)
BASOPHILS # BLD AUTO: 0.04 K/MM3 (ref 0–0.23)
BASOPHILS NFR BLD AUTO: 0 % (ref 0–2)
BILIRUB SERPL-MCNC: 0.3 MG/DL (ref 0.1–1)
BUN SERPL-MCNC: 42 MG/DL (ref 8–24)
CALCIUM SERPL-MCNC: 9.2 MG/DL (ref 8.5–10.1)
CHLORIDE SERPL-SCNC: 104 MMOL/L (ref 98–108)
CO2 SERPL-SCNC: 25 MMOL/L (ref 21–32)
CREAT SERPL-MCNC: 1.8 MG/DL (ref 0.6–1.2)
DEPRECATED RDW RBC AUTO: 49.1 FL (ref 35.1–46.3)
EOSINOPHIL # BLD AUTO: 0.18 K/MM3 (ref 0–0.68)
EOSINOPHIL NFR BLD AUTO: 2 % (ref 0–6)
ERYTHROCYTE [DISTWIDTH] IN BLOOD BY AUTOMATED COUNT: 14.1 % (ref 11.7–14.2)
GLOBULIN SER CALC-MCNC: 3.4 G/DL (ref 2.2–4)
GLUCOSE SERPL-MCNC: 87 MG/DL (ref 70–99)
HCT VFR BLD AUTO: 38.7 % (ref 37–53)
HGB BLD-MCNC: 12.1 G/DL (ref 13.5–17.5)
IMM GRANULOCYTES # BLD AUTO: 0.03 K/MM3 (ref 0–0.1)
IMM GRANULOCYTES NFR BLD AUTO: 0 % (ref 0–1)
LYMPHOCYTES # BLD AUTO: 1.61 K/MM3 (ref 0.84–5.2)
LYMPHOCYTES NFR BLD AUTO: 17 % (ref 21–46)
MAGNESIUM SERPL-MCNC: 2.2 MG/DL (ref 1.6–2.4)
MCHC RBC AUTO-ENTMCNC: 31.3 G/DL (ref 31.5–36.5)
MCV RBC AUTO: 95 FL (ref 80–100)
MONOCYTES # BLD AUTO: 1.22 K/MM3 (ref 0.16–1.47)
MONOCYTES NFR BLD AUTO: 13 % (ref 4–13)
NEUTROPHILS # BLD AUTO: 6.47 K/MM3 (ref 1.96–9.15)
NEUTROPHILS NFR BLD AUTO: 68 % (ref 41–73)
NRBC # BLD AUTO: 0 K/MM3 (ref 0–0.02)
NRBC BLD AUTO-RTO: 0 /100 WBC (ref 0–0.2)
PLATELET # BLD AUTO: 265 K/MM3 (ref 150–400)
POTASSIUM SERPL-SCNC: 4.2 MMOL/L (ref 3.5–5.5)
PROT SERPL-MCNC: 6.6 G/DL (ref 6.4–8.2)
SODIUM SERPL-SCNC: 136 MMOL/L (ref 136–145)
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
## 2020-09-17 NOTE — NUR
SHIFT SUMMARY
 
PT IS AOX3. HE DENIES PAIN, N/V, SOB. PT EXPERIENCES DIZZINESS WHEN STANDING
AND EXHIBITED EPSIODES OF HYPOTENSION. PHYSICIAN IS AWARE AND A VASOPRESSOR IS
ORDERED TID. PT HAS BEEN IN BED MOST OF SHIFT. PT VERBALIZED IMPROVEMENT FO
DIZZINESS WHEN SITTING. PT IS IN BED, CALL LIGHT IN REACH, BED IN LOW
POSITION. WILL CONTINUE TO MONITOR PT.

## 2020-09-17 NOTE — NUR
SHIFT SUMMARY
PT IS A 69 Y/O MALE, ADMITTED FOR ACUTE ON CHRONIC CHF. HE IS A&O X 4, 1PA IN
THE ROOM. PT IS STILL REPORTING DIZZINESS ON STANDING/AMBULATING. HS BP
IMPROVED /78. THIS AM, BP WAS DOWN TO 95/67. ALL OTHER VITAL SIGNS
STABLE. PT WAS MEDICATED AT HS FOR LOWER BACK AND L KNEE PAIN. NO COMPLAINTS
OF ACUTE NAUSEA OR SOB. NO OTHER ACUTE CHANGES IN PT CONDITION NOTED DURING
THE NIGHT. WILL CONTINUE TO MONITOR AND TREAT PER EMAR UNTIL HAND OFF TO DAY
SHIFT RN.

## 2020-09-18 LAB
ANION GAP SERPL CALCULATED.4IONS-SCNC: 6 MMOL/L (ref 6–16)
BUN SERPL-MCNC: 36 MG/DL (ref 8–24)
CALCIUM SERPL-MCNC: 9.4 MG/DL (ref 8.5–10.1)
CHLORIDE SERPL-SCNC: 109 MMOL/L (ref 98–108)
CO2 SERPL-SCNC: 25 MMOL/L (ref 21–32)
CREAT SERPL-MCNC: 1.44 MG/DL (ref 0.6–1.2)
GLUCOSE SERPL-MCNC: 89 MG/DL (ref 70–99)
POTASSIUM SERPL-SCNC: 4.4 MMOL/L (ref 3.5–5.5)
SODIUM SERPL-SCNC: 140 MMOL/L (ref 136–145)

## 2020-09-18 NOTE — NUR
DISCHARGE NOTE-
PT WAS GIVEN VERBAL AND WRITTEN DISCHARGE INSTRUCTIONS AND ACKNOWLEDGED
UNDERSTANDING OF THEM. IV DC'D AT THE TIME OF DISCHARGE. [facility] WITH FOLLOW UP APPOINTMENT TIME AND DATE.

## 2020-09-18 NOTE — NUR
CALLED DR MCLAUGHLIN FOR PARAMETERS FOR AMIODORONE AS IT WAS HELD FOR CLINICAL
JUDGEMENT PERVIOUSLY. NO PARAMETERS NEEDED PER ELAINE NEWMAN TO GIVE AS MED
DOES NOT AFFECT THE BP.

## 2020-09-18 NOTE — NUR
SHIFT SUMMARY
PT IS A 69 Y/O MALE, ADMITTED FOR ACUTE ON CHRONIC CHF. PT IS A&O X 4, SBA
TO THE BATHROOM WITH A FWW. PT IS STILL REPORTING DIZZINESS ON STANDING AND
AMBULATION. NO COMPLAINTS OF PAIN, SOB OR NAUSEA.  AM BP WAS LOW THIS AM, AT
89/65 AND 90/61 ON RECHECK. VITAL SIGNS OTHERWISE STABLE. PT SLEPT WELL
THROUGH THE NIGHT. NO OTHER ACUTE CHANGES IN PT CONDITION NOTED. WILL CONTINUE
TO MONITOR AND TREAT PER EMAR UNTIL HAND OFF TO DAY SHIFT RN.

## 2020-09-18 NOTE — NUR
ASSUMED CARE OF PT-
BEDSIDE REPORT COMPLETED WITH NIGHT RN KATIE. PER REPORT PT BP HAS BEEN LOW.
PT HAS IMDUR ORDERED AT NIGHT AND HIS BP WAS IN 'S WHEN IT WAS GIVEN
AND THE END OF THE NIGHT SBP WAS IN THE 90'S. PT C/O DIZZINESS WHEN HE
AMBULATES. MIDODRINE ORDERED FOR HYPOTENSION. WILL SPEAK TO DR FOR PARAMETERS
FOR THE AMIODORONE PRIOR TO ADMINISTRATION THIS MORNING.

## 2020-09-18 NOTE — NUR
Spiritual care visit conducted. Patient is sitting on EOB and in street
clothes. Patient tells that he will DC soon. I ask about his well being and he
explains that he is feeling much better but that he is anxious about his DC
because [facility] and he does not have a way to get to it, its
raining and he does not think he is strong enough to walk. I tell patient that
I will look into it. I talk to SHAHZAD Ward (Medical floor Charge Nurse) who
assures me that patient does not have to worry about it that either the [facility]
will come and get him or [facility] will cover the cost to get him to his vehicle.
I relay this information to the patient who states that this is great news and
he is relieved. Patient states that there is nothing more I could do for him.
I say a blessing on my way out of the .

## 2020-10-28 ENCOUNTER — HOSPITAL ENCOUNTER (INPATIENT)
Dept: HOSPITAL 95 - ER | Age: 70
LOS: 3 days | Discharge: HOME | DRG: 291 | End: 2020-10-31
Attending: INTERNAL MEDICINE | Admitting: INTERNAL MEDICINE
Payer: OTHER GOVERNMENT

## 2020-10-28 VITALS — BODY MASS INDEX: 24.33 KG/M2 | WEIGHT: 155.01 LBS | HEIGHT: 67.01 IN

## 2020-10-28 DIAGNOSIS — I34.0: ICD-10-CM

## 2020-10-28 DIAGNOSIS — E87.6: ICD-10-CM

## 2020-10-28 DIAGNOSIS — D63.1: ICD-10-CM

## 2020-10-28 DIAGNOSIS — J44.9: ICD-10-CM

## 2020-10-28 DIAGNOSIS — F41.9: ICD-10-CM

## 2020-10-28 DIAGNOSIS — E78.5: ICD-10-CM

## 2020-10-28 DIAGNOSIS — R74.01: ICD-10-CM

## 2020-10-28 DIAGNOSIS — Z95.810: ICD-10-CM

## 2020-10-28 DIAGNOSIS — F32.9: ICD-10-CM

## 2020-10-28 DIAGNOSIS — I25.10: ICD-10-CM

## 2020-10-28 DIAGNOSIS — I25.5: ICD-10-CM

## 2020-10-28 DIAGNOSIS — F17.210: ICD-10-CM

## 2020-10-28 DIAGNOSIS — I48.0: ICD-10-CM

## 2020-10-28 DIAGNOSIS — E44.0: ICD-10-CM

## 2020-10-28 DIAGNOSIS — Z95.5: ICD-10-CM

## 2020-10-28 DIAGNOSIS — N18.30: ICD-10-CM

## 2020-10-28 DIAGNOSIS — I13.0: Primary | ICD-10-CM

## 2020-10-28 DIAGNOSIS — K21.9: ICD-10-CM

## 2020-10-28 DIAGNOSIS — M19.90: ICD-10-CM

## 2020-10-28 DIAGNOSIS — Z86.73: ICD-10-CM

## 2020-10-28 DIAGNOSIS — Z79.82: ICD-10-CM

## 2020-10-28 DIAGNOSIS — I50.43: ICD-10-CM

## 2020-10-28 DIAGNOSIS — Z79.01: ICD-10-CM

## 2020-10-28 LAB
ALBUMIN SERPL BCP-MCNC: 3.2 G/DL (ref 3.4–5)
ALBUMIN/GLOB SERPL: 0.9 {RATIO} (ref 0.8–1.8)
ALT SERPL W P-5'-P-CCNC: 136 U/L (ref 12–78)
ANION GAP SERPL CALCULATED.4IONS-SCNC: 10 MMOL/L (ref 6–16)
AST SERPL W P-5'-P-CCNC: 45 U/L (ref 12–37)
BASOPHILS # BLD AUTO: 0.05 K/MM3 (ref 0–0.23)
BASOPHILS NFR BLD AUTO: 1 % (ref 0–2)
BILIRUB SERPL-MCNC: 1 MG/DL (ref 0.1–1)
BUN SERPL-MCNC: 26 MG/DL (ref 8–24)
CALCIUM SERPL-MCNC: 8.7 MG/DL (ref 8.5–10.1)
CHLORIDE SERPL-SCNC: 108 MMOL/L (ref 98–108)
CO2 SERPL-SCNC: 20 MMOL/L (ref 21–32)
CREAT SERPL-MCNC: 1.35 MG/DL (ref 0.6–1.2)
DEPRECATED RDW RBC AUTO: 58.5 FL (ref 35.1–46.3)
EOSINOPHIL # BLD AUTO: 0.09 K/MM3 (ref 0–0.68)
EOSINOPHIL NFR BLD AUTO: 1 % (ref 0–6)
ERYTHROCYTE [DISTWIDTH] IN BLOOD BY AUTOMATED COUNT: 16.2 % (ref 11.7–14.2)
GLOBULIN SER CALC-MCNC: 3.4 G/DL (ref 2.2–4)
GLUCOSE SERPL-MCNC: 97 MG/DL (ref 70–99)
HCT VFR BLD AUTO: 39.5 % (ref 37–53)
HGB BLD-MCNC: 12 G/DL (ref 13.5–17.5)
IMM GRANULOCYTES # BLD AUTO: 0.02 K/MM3 (ref 0–0.1)
IMM GRANULOCYTES NFR BLD AUTO: 0 % (ref 0–1)
LYMPHOCYTES # BLD AUTO: 0.95 K/MM3 (ref 0.84–5.2)
LYMPHOCYTES NFR BLD AUTO: 10 % (ref 21–46)
MCHC RBC AUTO-ENTMCNC: 30.4 G/DL (ref 31.5–36.5)
MCV RBC AUTO: 101 FL (ref 80–100)
MONOCYTES # BLD AUTO: 1.19 K/MM3 (ref 0.16–1.47)
MONOCYTES NFR BLD AUTO: 13 % (ref 4–13)
NEUTROPHILS # BLD AUTO: 6.81 K/MM3 (ref 1.96–9.15)
NEUTROPHILS NFR BLD AUTO: 75 % (ref 41–73)
NRBC # BLD AUTO: 0 K/MM3 (ref 0–0.02)
NRBC BLD AUTO-RTO: 0 /100 WBC (ref 0–0.2)
PLATELET # BLD AUTO: 230 K/MM3 (ref 150–400)
POTASSIUM SERPL-SCNC: 3.8 MMOL/L (ref 3.5–5.5)
PROT SERPL-MCNC: 6.6 G/DL (ref 6.4–8.2)
SODIUM SERPL-SCNC: 138 MMOL/L (ref 136–145)
TROPONIN I SERPL-MCNC: 0.03 NG/ML (ref 0–0.04)

## 2020-10-28 PROCEDURE — U0004 COV-19 TEST NON-CDC HGH THRU: HCPCS

## 2020-10-28 PROCEDURE — A9270 NON-COVERED ITEM OR SERVICE: HCPCS

## 2020-10-28 PROCEDURE — G0378 HOSPITAL OBSERVATION PER HR: HCPCS

## 2020-10-28 PROCEDURE — C9113 INJ PANTOPRAZOLE SODIUM, VIA: HCPCS

## 2020-10-28 NOTE — NUR
PATIENT TRANSFERED TO PCU FROM MEDICAL FLOOR SECOND HALF OF SHIFT. NEGATIVE
TROPONIN THIS AFTERNOON, PATIENT CONTINUED TO COMPLAIN OF 6-7/10 PAIN THIS
SHIFT. PATIENT AMBULATING COMFORTABLY IN ROOM WITH SBA, IV DIURESIS WITH
POSITIVE OUT PUT RESULTS. PATIENT PACED IN 110S WITH MANY PVCs, V TACH
ALTERNATING WITH PACED BEATS. ONE DOSE 1 GRAM MAG GIVEN, PATIENT STATES RELIEF
IN CHEST PAIN, RYTHYM REMAINS ABNORMAL. 2ND 1 GRAM MAG TO BE STARTED.

## 2020-10-28 NOTE — NUR
TRANSFER: LATE ENTRY FOR 1540
PATIENT ARRIVED TO UNIT VIA STRETCHER. PATIENT AMBULATED TO CHAIR WITH HIS
CANE. STEADY ON HIS FEET. PATIENT MOVES SLOWLY BUT CAREFULLY. PATIENT DENIED
INCREASED SOB OR DISCOMFORT AT THIS TIME.
AFTER INITIAL ADMISSION, PATIENT REPORTED INCREASED CHEST PAIN (SEE NURSE'S
NOTE). DR. TRIVEDI NOTIFIED. PATIENT ALERT AND ORIENTED X3. PATIENT REPORTED
NUMBNESS DOWN HIS LEFT ARM. PATIENT DENIED OTHER NUMBNESS/TINGLING. PATIENT
HAD EQUAL  STRENGTH IN ARMS AND EQUAL STRENGTH IN HIS LEGS. EXTREMITIES
ARE WEAK. PATIENT REPORTS HX OF CVA, BUT CANNOT REMEMBER WHICH SIDE. PATIENT
AMBULATED TO THE RESTROOM MULTIPLE TIMES. PATIENT REPORTS MINIMAL INCREASE IN
SOB WITH AMBULATION. LUNG SOUNDS ARE DIMINISHED THROUGHOUT WITH FINE CRACKLES
IN THE BASES. PATIENT CONTINUES TO REPORT THAT THE SOB IS IMPROVED OVER THIS
MORNING. PATIENT REPORTS SOME INCREASE IN CHEST PAIN WITH AMBULATION. PATIENT
REPORTS ICD. PATIENT REPORTS THAT HE IS DUE FOR ANOTHER "SHOCK" TREATMENT AT
THE [facility] IN THE NEXT COUPLE OF WEEKS.
DR. TRIVEDI CAME TO VISIT THE PATIENT. NEW ORDER TO TRANSFER THE PATIENT TO
THE PCU. REPORT GIVEN TO SHAHZAD FOY ON PCU. PATIENT TRANSFERED TO PCU 16.

## 2020-10-28 NOTE — NUR
ASSUMED CARE OF PATIENT AT APPROXIMATELY 1910 FROM LAW CERON RN.  PATIENT
REPORTS CHEST PAIN HAD RESOLVED DURING BEDSIDE REPORTS BUT MINUTES LATER
REPORTS PAIN 4/10 MID CHEST THAT DOESNT RADIATE ANYWHERE; NO LONGER HAS
NUMBNESS IN LEFT ARM; 2LPM O2 THAT PATIENT REPORTS REDUCED PAIN TO A 3/10;
WILL CALL HOSPITALIST TO UPDATE.  PATIENT REPORTS HE THINKS THE NITRO PASTE
HELPED.  PATIENT DENIES PAIN OTHERWISE, NUMBNESS, TINGLING, DIZZINESS OR
NAUSEA.  INDEPENENT IN ROOM SINCE ARRIVAL.  PACED W/ AFIB ON TELE; OXYGEN
SATURATION ABOVE 90% ON ROOM AIR.  PIV S/L.  1 GRAM OF MG GIVEN PER ORDER IV.
PATIENT CURRENTLY RESTING IN BED; CALL LIGHT IN REACH; BED IN LOWEST
POSISTION; WILL CONTINUE TO MONITOR AND ASSESS UNTIL END OF SHIFT.

## 2020-10-28 NOTE — NUR
CHEST PAIN:
PATIENT REPORTING A RETURN OF CHEST PAIN, ONLY NOW AT A HIGHER LEVEL OF PAIN
(7-8/10). VITALS ARE STABLE (SEE VITALS). PATIENT REPORTS SOB, BUT REPORTS
THAT IT IS IMPROVED OVER THIS MORNING. PATIENT REPORTS NUMBNESS DOWN THE LEFT
ARM ("FEELS LIKE MY ARM IS FALLING ASLEEP"). PATIENT POINTS TO MID-LEFT CHEST
WHEN REPORTING THE PAIN. PATIENT APPEARS UNCOMFORTABLE. NO DIAPHORESIS,
DIZZINESS OR VOMITING. PATIENT REPORTS MILD NAUSEA, BUT THINKS THAT THIS MIGHT
BE RELATED TO NOT EATING YET TODAY. NOTIFIED DR. TRIVEDI.

## 2020-10-28 NOTE — NUR
CP RESOLVED AFTER ONE TAB OF NITRO.  VSS.  WILL CONTINUE TO MONITOR AND ASSESS
UNTIL END OF SHIFT.  02 REMOVED.

## 2020-10-28 NOTE — NUR
PATIENT ARRIVED FROM MEDICAL VIA WHEELCHAIR. PATIENT SWITCHING CONSISTENTLY
BACK AND FORTH BETWEEN WIDE COMPLEX AFIB WITH RVR AND PACED IN -120'S.
PATIENT COMPLAINS OF 7/10 CHEST PAIN. DR. TRIVEDI NOTIFIED, ORDERS FOR 1 GRAM
MAGNESIUM SULFATE GIVEN. D/O TO FOLLOW WITH ANOTHER GRAM MAG IF RAPID RHYTHM
DOES NOT RESOLVE AFTER FIRST GRAM. THIS RN REQUESTED CARDIOLOGY CONSULT,
REQUEST DENIED AT THIS TIME.

## 2020-10-28 NOTE — NUR
CALLED GRANT VIVAS REGARDING PATIENT'S REPORT OF 3/10 CHEST PAIN; ORDERS FOR
NITRO SL.  WILL CONTINUE TO MONITOR AND ASSESS UNTIL END OF SHIFT.

## 2020-10-29 LAB
ALBUMIN SERPL BCP-MCNC: 3.1 G/DL (ref 3.4–5)
ALBUMIN/GLOB SERPL: 1 {RATIO} (ref 0.8–1.8)
ALT SERPL W P-5'-P-CCNC: 107 U/L (ref 12–78)
ANION GAP SERPL CALCULATED.4IONS-SCNC: 10 MMOL/L (ref 6–16)
AST SERPL W P-5'-P-CCNC: 32 U/L (ref 12–37)
BASOPHILS # BLD AUTO: 0.06 K/MM3 (ref 0–0.23)
BASOPHILS NFR BLD AUTO: 1 % (ref 0–2)
BILIRUB SERPL-MCNC: 1.1 MG/DL (ref 0.1–1)
BUN SERPL-MCNC: 30 MG/DL (ref 8–24)
CALCIUM SERPL-MCNC: 8.8 MG/DL (ref 8.5–10.1)
CHLORIDE SERPL-SCNC: 106 MMOL/L (ref 98–108)
CO2 SERPL-SCNC: 24 MMOL/L (ref 21–32)
CREAT SERPL-MCNC: 1.59 MG/DL (ref 0.6–1.2)
DEPRECATED RDW RBC AUTO: 55.4 FL (ref 35.1–46.3)
EOSINOPHIL # BLD AUTO: 0.19 K/MM3 (ref 0–0.68)
EOSINOPHIL NFR BLD AUTO: 2 % (ref 0–6)
ERYTHROCYTE [DISTWIDTH] IN BLOOD BY AUTOMATED COUNT: 16.1 % (ref 11.7–14.2)
GLOBULIN SER CALC-MCNC: 3 G/DL (ref 2.2–4)
GLUCOSE SERPL-MCNC: 94 MG/DL (ref 70–99)
HCT VFR BLD AUTO: 37.3 % (ref 37–53)
HGB BLD-MCNC: 11.8 G/DL (ref 13.5–17.5)
IMM GRANULOCYTES # BLD AUTO: 0.01 K/MM3 (ref 0–0.1)
IMM GRANULOCYTES NFR BLD AUTO: 0 % (ref 0–1)
LYMPHOCYTES # BLD AUTO: 1.58 K/MM3 (ref 0.84–5.2)
LYMPHOCYTES NFR BLD AUTO: 20 % (ref 21–46)
MCHC RBC AUTO-ENTMCNC: 31.6 G/DL (ref 31.5–36.5)
MCV RBC AUTO: 96 FL (ref 80–100)
MONOCYTES # BLD AUTO: 1.28 K/MM3 (ref 0.16–1.47)
MONOCYTES NFR BLD AUTO: 16 % (ref 4–13)
NEUTROPHILS # BLD AUTO: 4.75 K/MM3 (ref 1.96–9.15)
NEUTROPHILS NFR BLD AUTO: 60 % (ref 41–73)
NRBC # BLD AUTO: 0 K/MM3 (ref 0–0.02)
NRBC BLD AUTO-RTO: 0 /100 WBC (ref 0–0.2)
PLATELET # BLD AUTO: 246 K/MM3 (ref 150–400)
POTASSIUM SERPL-SCNC: 3.5 MMOL/L (ref 3.5–5.5)
PROT SERPL-MCNC: 6.1 G/DL (ref 6.4–8.2)
SODIUM SERPL-SCNC: 140 MMOL/L (ref 136–145)

## 2020-10-29 NOTE — NUR
ASSUMED PATIENT CARE. PATIENT RESTING COMFORTABLY IN BED, CONVERSING WITH
NURSING STAFF. NO SIGNS OF ACUTE DISTRESS, WCTM.

## 2020-10-29 NOTE — NUR
Patient is sitting on a chair and alert. Patient immediately tells me about
the events that led to his hospitalization, his career history and the plan
going forward with his care. Patient then talks at length about his philosophy
of Hinduism. In the center of many thoughts is a deep and honest connection
with God. I normalize patient's experience, and provide therapeutic listening
and prayer. Patient responds well and shows signs of an elevated mood. I will
continue to remain available to patient and family.

## 2020-10-29 NOTE — NUR
REPORT RECEIVED FROM LAW PIKE AT 1600, PT IS HERE FOR CHF EXACERBATION AND
IS ON DIURETICS, PT IS ALERT AND ORIENTED ABLE TO MAKE NEEDS KNOWN. NO ACUTE
CHANGE PER REPORT. DENIES CHEST PAIN AS OF THIS TIME. VITALS HRR PACED AT
100'S, BP SYSTOLIC 120'S, SATS ABOVE 95% ON RA, AFEBRILE. PT INDEPENDENT IN
THE ROOM. PT IN BED AT THIS TIME RESTING CALL LIGHTS IN REACH, WILL REPORT TO
ONCOMING SHIFT

## 2020-10-29 NOTE — NUR
PATIENT SLEPT ABOUT FOUR HOURS; PATIENT REPORTS COUGHING HAS BEEN KEEPING HIM
AWAKE AND NOTHING SEEMS TO HELP.  VSS.  WILL CONTINUE TO MONITOR AND ASSESS
UNTIL END OF SHIFT.

## 2020-10-30 LAB
ANION GAP SERPL CALCULATED.4IONS-SCNC: 7 MMOL/L (ref 6–16)
BASOPHILS # BLD AUTO: 0.06 K/MM3 (ref 0–0.23)
BASOPHILS NFR BLD AUTO: 1 % (ref 0–2)
BUN SERPL-MCNC: 36 MG/DL (ref 8–24)
CALCIUM SERPL-MCNC: 8.7 MG/DL (ref 8.5–10.1)
CHLORIDE SERPL-SCNC: 104 MMOL/L (ref 98–108)
CO2 SERPL-SCNC: 28 MMOL/L (ref 21–32)
CREAT SERPL-MCNC: 1.49 MG/DL (ref 0.6–1.2)
DEPRECATED RDW RBC AUTO: 56.6 FL (ref 35.1–46.3)
EOSINOPHIL # BLD AUTO: 0.19 K/MM3 (ref 0–0.68)
EOSINOPHIL NFR BLD AUTO: 2 % (ref 0–6)
ERYTHROCYTE [DISTWIDTH] IN BLOOD BY AUTOMATED COUNT: 16 % (ref 11.7–14.2)
GLUCOSE SERPL-MCNC: 85 MG/DL (ref 70–99)
HCT VFR BLD AUTO: 36.6 % (ref 37–53)
HGB BLD-MCNC: 11.7 G/DL (ref 13.5–17.5)
IMM GRANULOCYTES # BLD AUTO: 0.03 K/MM3 (ref 0–0.1)
IMM GRANULOCYTES NFR BLD AUTO: 0 % (ref 0–1)
LYMPHOCYTES # BLD AUTO: 1.17 K/MM3 (ref 0.84–5.2)
LYMPHOCYTES NFR BLD AUTO: 15 % (ref 21–46)
MCHC RBC AUTO-ENTMCNC: 32 G/DL (ref 31.5–36.5)
MCV RBC AUTO: 97 FL (ref 80–100)
MONOCYTES # BLD AUTO: 1.32 K/MM3 (ref 0.16–1.47)
MONOCYTES NFR BLD AUTO: 17 % (ref 4–13)
NEUTROPHILS # BLD AUTO: 5.18 K/MM3 (ref 1.96–9.15)
NEUTROPHILS NFR BLD AUTO: 65 % (ref 41–73)
NRBC # BLD AUTO: 0 K/MM3 (ref 0–0.02)
NRBC BLD AUTO-RTO: 0 /100 WBC (ref 0–0.2)
PLATELET # BLD AUTO: 220 K/MM3 (ref 150–400)
POTASSIUM SERPL-SCNC: 3.4 MMOL/L (ref 3.5–5.5)
SODIUM SERPL-SCNC: 139 MMOL/L (ref 136–145)

## 2020-10-30 NOTE — NUR
SUMMARY
NO ACUTE CHANGES NOTED THROUGH THE DAY, PT IS INDEPENDENT IN THE ROOM,
CONTINUES TO DENY CP/PRESSURE. HE HAS AMBULATED IN THE HALLS AND AROUND HIS
ROOM WITH NO PROBLEMS. VOIDING WNL, TOLERATING PO INTAKE, CALLS FOR ASSISTANCE
PRN. WCKE & REPORT TO NOC RN.

## 2020-10-30 NOTE — NUR
shift summary
 
pt rested through night
ao
room air - sats >90%
tele paced 100's
voiding indpendently to bathroom
uop >700ml
no c/o chest pain/or pain in general
vss
 
call light within reach, bed in lowest position. will continue to monitor.

## 2020-10-31 LAB
ANION GAP SERPL CALCULATED.4IONS-SCNC: 7 MMOL/L (ref 6–16)
BUN SERPL-MCNC: 39 MG/DL (ref 8–24)
CALCIUM SERPL-MCNC: 9.2 MG/DL (ref 8.5–10.1)
CHLORIDE SERPL-SCNC: 103 MMOL/L (ref 98–108)
CO2 SERPL-SCNC: 27 MMOL/L (ref 21–32)
CREAT SERPL-MCNC: 1.65 MG/DL (ref 0.6–1.2)
GLUCOSE SERPL-MCNC: 90 MG/DL (ref 70–99)
POTASSIUM SERPL-SCNC: 4.1 MMOL/L (ref 3.5–5.5)
SODIUM SERPL-SCNC: 137 MMOL/L (ref 136–145)

## 2020-10-31 NOTE — NUR
shift summary
 
pt rested through night
ao
tele paced 100's
room air
no bm
voiding independently to bathroom
no c/o pain
vss
 
call light within reach, bed in lowest position. will continue to monitor.

## 2020-10-31 NOTE — NUR
PT WAS GIVEN DC INSTRUCTIONS REGARDING FOLLOW UP APPOINTMENTS AND MEDICTION
INSTRUCTIONS. PT WAS GIVEN INSTRUCTIONS ON HOW TO COLLECT HIS MEDS ON THE
WEEKEND. CAB WAS ARRANGED TO PICK HIM UP AND HE WAS ESCORTED TO FRONT DOOR BY
STAFF VIA WHEEL CHAIR

## 2020-10-31 NOTE — NUR
ASSUMED CARE:
 
PT RESTING QUIETLY AT THIS TIME. PACED, NSR ON TELE. NO ACUTE NEEDS OR
DISTRESS NOTED AT THIS TIME.

## 2020-11-24 ENCOUNTER — HOSPITAL ENCOUNTER (EMERGENCY)
Dept: HOSPITAL 95 - ER | Age: 70
Discharge: HOME | End: 2020-11-24
Payer: OTHER GOVERNMENT

## 2020-11-24 VITALS — WEIGHT: 155.01 LBS | BODY MASS INDEX: 24.33 KG/M2 | HEIGHT: 67 IN

## 2020-11-24 DIAGNOSIS — Z86.73: ICD-10-CM

## 2020-11-24 DIAGNOSIS — I48.91: ICD-10-CM

## 2020-11-24 DIAGNOSIS — Z87.891: ICD-10-CM

## 2020-11-24 DIAGNOSIS — Z79.82: ICD-10-CM

## 2020-11-24 DIAGNOSIS — Z79.899: ICD-10-CM

## 2020-11-24 DIAGNOSIS — Z95.0: ICD-10-CM

## 2020-11-24 DIAGNOSIS — J44.9: ICD-10-CM

## 2020-11-24 DIAGNOSIS — I11.0: Primary | ICD-10-CM

## 2020-11-24 DIAGNOSIS — Z79.01: ICD-10-CM

## 2020-11-24 DIAGNOSIS — I25.10: ICD-10-CM

## 2020-11-24 DIAGNOSIS — I50.9: ICD-10-CM

## 2020-11-24 LAB
ALBUMIN SERPL BCP-MCNC: 3.3 G/DL (ref 3.4–5)
ALBUMIN/GLOB SERPL: 1 {RATIO} (ref 0.8–1.8)
ALT SERPL W P-5'-P-CCNC: 43 U/L (ref 12–78)
ANION GAP SERPL CALCULATED.4IONS-SCNC: 6 MMOL/L (ref 6–16)
AST SERPL W P-5'-P-CCNC: 32 U/L (ref 12–37)
BASOPHILS # BLD AUTO: 0.07 K/MM3 (ref 0–0.23)
BASOPHILS NFR BLD AUTO: 1 % (ref 0–2)
BILIRUB SERPL-MCNC: 1.8 MG/DL (ref 0.1–1)
BUN SERPL-MCNC: 41 MG/DL (ref 8–24)
CALCIUM SERPL-MCNC: 9.4 MG/DL (ref 8.5–10.1)
CHLORIDE SERPL-SCNC: 107 MMOL/L (ref 98–108)
CO2 SERPL-SCNC: 26 MMOL/L (ref 21–32)
CREAT SERPL-MCNC: 1.49 MG/DL (ref 0.6–1.2)
DEPRECATED RDW RBC AUTO: 56.9 FL (ref 35.1–46.3)
EOSINOPHIL # BLD AUTO: 0.05 K/MM3 (ref 0–0.68)
EOSINOPHIL NFR BLD AUTO: 1 % (ref 0–6)
ERYTHROCYTE [DISTWIDTH] IN BLOOD BY AUTOMATED COUNT: 15.7 % (ref 11.7–14.2)
GLOBULIN SER CALC-MCNC: 3.3 G/DL (ref 2.2–4)
GLUCOSE SERPL-MCNC: 102 MG/DL (ref 70–99)
HCT VFR BLD AUTO: 40.9 % (ref 37–53)
HGB BLD-MCNC: 12.8 G/DL (ref 13.5–17.5)
IMM GRANULOCYTES # BLD AUTO: 0.04 K/MM3 (ref 0–0.1)
IMM GRANULOCYTES NFR BLD AUTO: 0 % (ref 0–1)
LYMPHOCYTES # BLD AUTO: 1.43 K/MM3 (ref 0.84–5.2)
LYMPHOCYTES NFR BLD AUTO: 15 % (ref 21–46)
MCHC RBC AUTO-ENTMCNC: 31.3 G/DL (ref 31.5–36.5)
MCV RBC AUTO: 99 FL (ref 80–100)
MONOCYTES # BLD AUTO: 1.35 K/MM3 (ref 0.16–1.47)
MONOCYTES NFR BLD AUTO: 15 % (ref 4–13)
NEUTROPHILS # BLD AUTO: 6.34 K/MM3 (ref 1.96–9.15)
NEUTROPHILS NFR BLD AUTO: 68 % (ref 41–73)
NRBC # BLD AUTO: 0 K/MM3 (ref 0–0.02)
NRBC BLD AUTO-RTO: 0 /100 WBC (ref 0–0.2)
PLATELET # BLD AUTO: 217 K/MM3 (ref 150–400)
POTASSIUM SERPL-SCNC: 4.9 MMOL/L (ref 3.5–5.5)
PROT SERPL-MCNC: 6.6 G/DL (ref 6.4–8.2)
SODIUM SERPL-SCNC: 139 MMOL/L (ref 136–145)
TROPONIN I SERPL-MCNC: 0.02 NG/ML (ref 0–0.04)

## 2020-11-29 ENCOUNTER — HOSPITAL ENCOUNTER (INPATIENT)
Dept: HOSPITAL 95 - ER | Age: 70
LOS: 2 days | Discharge: HOME HEALTH | DRG: 291 | End: 2020-12-01
Attending: HOSPITALIST | Admitting: HOSPITALIST
Payer: OTHER GOVERNMENT

## 2020-11-29 VITALS — WEIGHT: 149.69 LBS | HEIGHT: 67.01 IN | BODY MASS INDEX: 23.49 KG/M2

## 2020-11-29 DIAGNOSIS — J96.01: ICD-10-CM

## 2020-11-29 DIAGNOSIS — Z87.891: ICD-10-CM

## 2020-11-29 DIAGNOSIS — I50.43: ICD-10-CM

## 2020-11-29 DIAGNOSIS — Z23: ICD-10-CM

## 2020-11-29 DIAGNOSIS — K21.9: ICD-10-CM

## 2020-11-29 DIAGNOSIS — Z20.828: ICD-10-CM

## 2020-11-29 DIAGNOSIS — I48.91: ICD-10-CM

## 2020-11-29 DIAGNOSIS — J44.9: ICD-10-CM

## 2020-11-29 DIAGNOSIS — Z95.5: ICD-10-CM

## 2020-11-29 DIAGNOSIS — I42.9: ICD-10-CM

## 2020-11-29 DIAGNOSIS — N18.30: ICD-10-CM

## 2020-11-29 DIAGNOSIS — I95.9: ICD-10-CM

## 2020-11-29 DIAGNOSIS — Z79.899: ICD-10-CM

## 2020-11-29 DIAGNOSIS — Z79.01: ICD-10-CM

## 2020-11-29 DIAGNOSIS — I25.10: ICD-10-CM

## 2020-11-29 DIAGNOSIS — Z79.82: ICD-10-CM

## 2020-11-29 DIAGNOSIS — Z86.73: ICD-10-CM

## 2020-11-29 DIAGNOSIS — I13.0: Primary | ICD-10-CM

## 2020-11-29 LAB
ALBUMIN SERPL BCP-MCNC: 3.4 G/DL (ref 3.4–5)
ALBUMIN/GLOB SERPL: 1 {RATIO} (ref 0.8–1.8)
ALT SERPL W P-5'-P-CCNC: 84 U/L (ref 12–78)
ANION GAP SERPL CALCULATED.4IONS-SCNC: 6 MMOL/L (ref 6–16)
AST SERPL W P-5'-P-CCNC: 40 U/L (ref 12–37)
BASOPHILS # BLD AUTO: 0.07 K/MM3 (ref 0–0.23)
BASOPHILS NFR BLD AUTO: 1 % (ref 0–2)
BILIRUB SERPL-MCNC: 1.6 MG/DL (ref 0.1–1)
BUN SERPL-MCNC: 38 MG/DL (ref 8–24)
CALCIUM SERPL-MCNC: 9.5 MG/DL (ref 8.5–10.1)
CHLORIDE SERPL-SCNC: 106 MMOL/L (ref 98–108)
CO2 SERPL-SCNC: 28 MMOL/L (ref 21–32)
CREAT SERPL-MCNC: 1.61 MG/DL (ref 0.6–1.2)
DEPRECATED RDW RBC AUTO: 58.4 FL (ref 35.1–46.3)
EOSINOPHIL # BLD AUTO: 0.04 K/MM3 (ref 0–0.68)
EOSINOPHIL NFR BLD AUTO: 0 % (ref 0–6)
ERYTHROCYTE [DISTWIDTH] IN BLOOD BY AUTOMATED COUNT: 15.9 % (ref 11.7–14.2)
GLOBULIN SER CALC-MCNC: 3.3 G/DL (ref 2.2–4)
GLUCOSE SERPL-MCNC: 101 MG/DL (ref 70–99)
HCT VFR BLD AUTO: 41.7 % (ref 37–53)
HGB BLD-MCNC: 12.8 G/DL (ref 13.5–17.5)
IMM GRANULOCYTES # BLD AUTO: 0.03 K/MM3 (ref 0–0.1)
IMM GRANULOCYTES NFR BLD AUTO: 0 % (ref 0–1)
LYMPHOCYTES # BLD AUTO: 1.45 K/MM3 (ref 0.84–5.2)
LYMPHOCYTES NFR BLD AUTO: 13 % (ref 21–46)
MCHC RBC AUTO-ENTMCNC: 30.7 G/DL (ref 31.5–36.5)
MCV RBC AUTO: 100 FL (ref 80–100)
MONOCYTES # BLD AUTO: 1.59 K/MM3 (ref 0.16–1.47)
MONOCYTES NFR BLD AUTO: 14 % (ref 4–13)
NEUTROPHILS # BLD AUTO: 7.86 K/MM3 (ref 1.96–9.15)
NEUTROPHILS NFR BLD AUTO: 71 % (ref 41–73)
NRBC # BLD AUTO: 0.03 K/MM3 (ref 0–0.02)
NRBC BLD AUTO-RTO: 0.3 /100 WBC (ref 0–0.2)
PLATELET # BLD AUTO: 241 K/MM3 (ref 150–400)
POTASSIUM SERPL-SCNC: 3.8 MMOL/L (ref 3.5–5.5)
PROT SERPL-MCNC: 6.7 G/DL (ref 6.4–8.2)
SODIUM SERPL-SCNC: 140 MMOL/L (ref 136–145)

## 2020-11-29 PROCEDURE — C9113 INJ PANTOPRAZOLE SODIUM, VIA: HCPCS

## 2020-11-29 PROCEDURE — 3E0234Z INTRODUCTION OF SERUM, TOXOID AND VACCINE INTO MUSCLE, PERCUTANEOUS APPROACH: ICD-10-PCS | Performed by: HOSPITALIST

## 2020-11-30 LAB
ANION GAP SERPL CALCULATED.4IONS-SCNC: 6 MMOL/L (ref 6–16)
BUN SERPL-MCNC: 39 MG/DL (ref 8–24)
CALCIUM SERPL-MCNC: 8.9 MG/DL (ref 8.5–10.1)
CHLORIDE SERPL-SCNC: 108 MMOL/L (ref 98–108)
CO2 SERPL-SCNC: 27 MMOL/L (ref 21–32)
CREAT SERPL-MCNC: 1.42 MG/DL (ref 0.6–1.2)
GLUCOSE SERPL-MCNC: 102 MG/DL (ref 70–99)
POTASSIUM SERPL-SCNC: 3.5 MMOL/L (ref 3.5–5.5)
SODIUM SERPL-SCNC: 141 MMOL/L (ref 136–145)

## 2020-11-30 NOTE — NUR
SHIFT SUMMARY
 
PT ARRIVED TO THE UNIT AROUND 2000 IN STABLE CONDTION. VITALS WERE -111
SYSTOLIC, HR -120 BPM UNTIL AM WHEN IT CAME DOWN TO THE 90'S, HR WAS
PACED PREVENTING AFIB RVR. PT WAS ON BIPAP FOR A COUPLE HOURS BECAUSE PT
REQUESTED TO WEAR IT, O2 SATS WERE IN THE HIGH 90'S, PT THEN TRANSFERED TO
2LPM NC WITH NO DROP IN O2 SATS. PT HAD A MILD OCCASIONAL COUGH AND STATED
ONLY MILD SOB AT TIMES T/O THE NIGHT STATING HE FELT MUCH BETTER BY AM. PT WAS
ABLE TO SLEEP WITH NC AND O2 SATS REMAINED IN THE 90'S. PT HAD AN OTHERWISE
QUIET, UNEVENTFUL EVENING WITH NO COMPLAINTS. WILL CONTINUE TO MONITOR UNTIL
SHIFT CHANGE.

## 2020-11-30 NOTE — NUR
SHIFT SUMMARY:
 
PT CONTINUES WITH SOFT BP, LAST SYSTOLIC , LASIX TO BE GIVEN PER DR BRUNO'S PARAMETERS.
PT CONTINUED A&O T/OUT SHIFT, HR CONTROLLED, MAINTAINED SATS >95% WHILE ON
ROOM AIR OR 1 L/MIN VIA NC.
PT EVALUATED BY PT/OT TODAY, TOLERATED BOTH WELL.
REPORT TO SHAHZAD PA WHO STATES HE WILL MEDICATE PT WITH LASIX.

## 2020-11-30 NOTE — NUR
DR BRUNO NOTIFIED OF PT BLOOD PRESSURE, NO NEW MEDICATION ORDERS AT THIS TIME.
PER DR BRUNO, REPEAT VITAL SIGNS IN 30-45 MINS AND UPDATE HER OF RESULTS.

## 2020-12-01 LAB
ANION GAP SERPL CALCULATED.4IONS-SCNC: 4 MMOL/L (ref 6–16)
BUN SERPL-MCNC: 42 MG/DL (ref 8–24)
CALCIUM SERPL-MCNC: 8.7 MG/DL (ref 8.5–10.1)
CHLORIDE SERPL-SCNC: 108 MMOL/L (ref 98–108)
CO2 SERPL-SCNC: 29 MMOL/L (ref 21–32)
CREAT SERPL-MCNC: 1.5 MG/DL (ref 0.6–1.2)
GLUCOSE SERPL-MCNC: 91 MG/DL (ref 70–99)
POTASSIUM SERPL-SCNC: 3.5 MMOL/L (ref 3.5–5.5)
SODIUM SERPL-SCNC: 141 MMOL/L (ref 136–145)

## 2020-12-01 NOTE — NUR
DISCHARGE SUMMARY
PT A&Ox3; CALM AND COOPERATIVE WITH CARE. PT SITTING ON SIDE OF BED OR IN
CHAIR FOR MAJOIRTY OF SHIFT, UP IN ROOM IND. PT ON 1L O2 VIA NC THIS AM,
TITRATED TO RA; SPO2 94-98% ON RA. PT DENIES PAIN, CHEST PAIN, NAUSEA AND
DIZZINESS T/O SHIFT. PT RECEIVING IV LASIX THIS AM. VSS. NO OTHER ACUTE
CHANGES NOTED DURING SHIFT. PT EDUCATED ON DISCHARGE INSTRUCTIONS, MEDICATION
CHANGES AND FOLLOW UP APPOINTMENT. [facility] REQUEST. PT
EDUCATED ON CHF AND DAILY CHECK LIST OF SYMPTOMS; PT ENCOURAGED TO DO DAILY
WEIGHTS AT HOME AND MONITOR FOR S/SX OF EXACERBATION. PT LEFT ROOM VIA
WHEELCHAIR AT 1435.

## 2020-12-01 NOTE — NUR
SHIFT SUMMARY
 
PT WAS QUIET, PLEASENT AND COOPERATIVE WITH CARE. HE HAD AN UNEVENTFUL NIGHT
WITH PLENTY OF REST. BP WAS LOW T/O THE NIGHT 103-85 SYSTOLIC, DID NOT GIVE
EVENING IMDUR DUE TO LOW BP AND CONCURRENT ADMINISTRATION OF LASIX. HR STABLE
 100% PACED. PT WAS ABLE TO AMBULATE IN THE [place] W/O SOB OR
DIFFICULTIES. PT WAS ON 1LPM VIA NC WITH O2 SATS IN THE 90'S. PT OTHERWISE HAD
AN UNEVENTFUL NIGHT.

## 2020-12-19 ENCOUNTER — HOSPITAL ENCOUNTER (INPATIENT)
Dept: HOSPITAL 95 - ER | Age: 70
LOS: 2 days | Discharge: HOME | DRG: 292 | End: 2020-12-21
Attending: INTERNAL MEDICINE | Admitting: INTERNAL MEDICINE
Payer: OTHER GOVERNMENT

## 2020-12-19 VITALS — HEIGHT: 67.99 IN | BODY MASS INDEX: 24.52 KG/M2 | WEIGHT: 161.82 LBS

## 2020-12-19 DIAGNOSIS — N17.9: ICD-10-CM

## 2020-12-19 DIAGNOSIS — I25.10: ICD-10-CM

## 2020-12-19 DIAGNOSIS — R74.01: ICD-10-CM

## 2020-12-19 DIAGNOSIS — Z79.01: ICD-10-CM

## 2020-12-19 DIAGNOSIS — Z79.82: ICD-10-CM

## 2020-12-19 DIAGNOSIS — I13.0: ICD-10-CM

## 2020-12-19 DIAGNOSIS — Z86.73: ICD-10-CM

## 2020-12-19 DIAGNOSIS — N18.30: ICD-10-CM

## 2020-12-19 DIAGNOSIS — Z95.810: ICD-10-CM

## 2020-12-19 DIAGNOSIS — J44.9: ICD-10-CM

## 2020-12-19 DIAGNOSIS — I48.91: ICD-10-CM

## 2020-12-19 DIAGNOSIS — Z87.891: ICD-10-CM

## 2020-12-19 DIAGNOSIS — I50.43: Primary | ICD-10-CM

## 2020-12-19 DIAGNOSIS — Z95.5: ICD-10-CM

## 2020-12-19 LAB
ALBUMIN SERPL BCP-MCNC: 3.2 G/DL (ref 3.4–5)
ALBUMIN/GLOB SERPL: 0.9 {RATIO} (ref 0.8–1.8)
ALT SERPL W P-5'-P-CCNC: 336 U/L (ref 12–78)
ANION GAP SERPL CALCULATED.4IONS-SCNC: 9 MMOL/L (ref 6–16)
AST SERPL W P-5'-P-CCNC: 189 U/L (ref 12–37)
BASOPHILS # BLD AUTO: 0.03 K/MM3 (ref 0–0.23)
BASOPHILS NFR BLD AUTO: 0 % (ref 0–2)
BILIRUB SERPL-MCNC: 2.8 MG/DL (ref 0.1–1)
BUN SERPL-MCNC: 64 MG/DL (ref 8–24)
CALCIUM SERPL-MCNC: 9.3 MG/DL (ref 8.5–10.1)
CHLORIDE SERPL-SCNC: 105 MMOL/L (ref 98–108)
CK MB CFR SERPL CALC: 2.1 % (ref 0–4)
CK SERPL-CCNC: 114 U/L (ref 39–308)
CO2 SERPL-SCNC: 24 MMOL/L (ref 21–32)
CREAT SERPL-MCNC: 2.06 MG/DL (ref 0.6–1.2)
DEPRECATED RDW RBC AUTO: 53.7 FL (ref 35.1–46.3)
EOSINOPHIL # BLD AUTO: 0.03 K/MM3 (ref 0–0.68)
EOSINOPHIL NFR BLD AUTO: 0 % (ref 0–6)
ERYTHROCYTE [DISTWIDTH] IN BLOOD BY AUTOMATED COUNT: 15.8 % (ref 11.7–14.2)
GLOBULIN SER CALC-MCNC: 3.5 G/DL (ref 2.2–4)
GLUCOSE SERPL-MCNC: 102 MG/DL (ref 70–99)
HCT VFR BLD AUTO: 40.6 % (ref 37–53)
HGB BLD-MCNC: 12.7 G/DL (ref 13.5–17.5)
IMM GRANULOCYTES # BLD AUTO: 0.04 K/MM3 (ref 0–0.1)
IMM GRANULOCYTES NFR BLD AUTO: 0 % (ref 0–1)
LYMPHOCYTES # BLD AUTO: 1.35 K/MM3 (ref 0.84–5.2)
LYMPHOCYTES NFR BLD AUTO: 15 % (ref 21–46)
MCHC RBC AUTO-ENTMCNC: 31.3 G/DL (ref 31.5–36.5)
MCV RBC AUTO: 96 FL (ref 80–100)
MONOCYTES # BLD AUTO: 1.16 K/MM3 (ref 0.16–1.47)
MONOCYTES NFR BLD AUTO: 13 % (ref 4–13)
NEUTROPHILS # BLD AUTO: 6.57 K/MM3 (ref 1.96–9.15)
NEUTROPHILS NFR BLD AUTO: 72 % (ref 41–73)
NRBC # BLD AUTO: 0.03 K/MM3 (ref 0–0.02)
NRBC BLD AUTO-RTO: 0.3 /100 WBC (ref 0–0.2)
PLATELET # BLD AUTO: 220 K/MM3 (ref 150–400)
POTASSIUM SERPL-SCNC: 4.7 MMOL/L (ref 3.5–5.5)
PROT SERPL-MCNC: 6.7 G/DL (ref 6.4–8.2)
SODIUM SERPL-SCNC: 138 MMOL/L (ref 136–145)
TROPONIN I SERPL-MCNC: 0.07 NG/ML (ref 0–0.04)
TROPONIN I SERPL-MCNC: 0.07 NG/ML (ref 0–0.04)

## 2020-12-19 PROCEDURE — G0378 HOSPITAL OBSERVATION PER HR: HCPCS

## 2020-12-19 NOTE — NUR
PT CAME IN TO ED TO MEDICAL FLOOR DUE TO SOB FOR MORE THAN A WEEK NOW/CHF. PT
AOX; CALLS APPROPRIATELY. 1P ASSIST. DYSPNEA ON EXERTION; PT IS NEEDING A
SUPPLEMENTAL O2; ON 1-2L OF O2 PRN. VERY SOB ON ANY EXERTION. SATS ABOVE 90S.
COURSE LUNG SOUNDS THROUGHOUT, PT ALSO HAS +2 EDEMA BLE. CHEST XRAY REVEALED
VASCULAR CONGESTION, TROP ELAVATED AND BNP IS 3878 WITH AN EF 20%. IV ON LAC,
AND PT IS ON XARELTO. PT HAS PACEMAKER ICD AND IS ON TELE. PT HAD A HX OF
STROKE IN 2019, STENTS, TIA, KNEE SURGERY , COPD, AFIB AND CAD. PT ALSO STATED
LIVES ALONE AT HOME.  PT ORIENTED IN THE ROOM AND CALL LIGHTS WITHIN REACH.

## 2020-12-19 NOTE — NUR
@2130- RECEIVED CALL FROM TELE STATING PT. HAD 6 BEAT RUN OF VTACH W/HR OF
124. THIS NURSE CHECKED ON PT. IN ROOM. PT. DENIED CP, C/O FEELING A
LITTLE SOB. PT. ON 1L OF O2 VIA NC, VSS. CHARGE RN OLIVER MADE AWARE AND
HOSPITALIST DR. MATHEW NOTIFIED. WHILE ON THE PHONE WITH PHYSICIAN RECEIVED
ANOTHER CALL FROM TELE STATING PREVIOUS RHYTHM INCORRECT. PT. DID NOT HAVE
AN EPISODE OF VTACH. PT. AT THAT TIME WAS AFIB W/BBB AND HR . ALSO
INFORMED BY MOOI, PACEMAKER SET HIGH . DR. MATHEW MADE AWARE.
RECEIVED ORDER TO GIVE NOW DOSE OF COREG AND CONT TO MONITOR. ADMINISTERED PER
ORDER, TOLERATED WELL. PT. RESTING QUIETLY IN BED, NO APPARENT DISTRESS NOTED.
NO COMPLAINTS AT THIS TIME. DENIES ANY NEEDS. CALL LIGHT WITHIN REACH AND SIDE
RAILS UPX2. WILL CONT TO MONITOR.

## 2020-12-20 LAB
ALBUMIN SERPL BCP-MCNC: 2.6 G/DL (ref 3.4–5)
ALBUMIN/GLOB SERPL: 0.9 {RATIO} (ref 0.8–1.8)
ALT SERPL W P-5'-P-CCNC: 291 U/L (ref 12–78)
ANION GAP SERPL CALCULATED.4IONS-SCNC: 8 MMOL/L (ref 6–16)
AST SERPL W P-5'-P-CCNC: 145 U/L (ref 12–37)
BASOPHILS # BLD AUTO: 0.05 K/MM3 (ref 0–0.23)
BASOPHILS NFR BLD AUTO: 1 % (ref 0–2)
BILIRUB SERPL-MCNC: 1.9 MG/DL (ref 0.1–1)
BUN SERPL-MCNC: 62 MG/DL (ref 8–24)
CALCIUM SERPL-MCNC: 8.8 MG/DL (ref 8.5–10.1)
CHLORIDE SERPL-SCNC: 107 MMOL/L (ref 98–108)
CO2 SERPL-SCNC: 25 MMOL/L (ref 21–32)
CREAT SERPL-MCNC: 2.01 MG/DL (ref 0.6–1.2)
DEPRECATED RDW RBC AUTO: 54 FL (ref 35.1–46.3)
EOSINOPHIL # BLD AUTO: 0.13 K/MM3 (ref 0–0.68)
EOSINOPHIL NFR BLD AUTO: 2 % (ref 0–6)
ERYTHROCYTE [DISTWIDTH] IN BLOOD BY AUTOMATED COUNT: 15.7 % (ref 11.7–14.2)
GLOBULIN SER CALC-MCNC: 3 G/DL (ref 2.2–4)
GLUCOSE SERPL-MCNC: 90 MG/DL (ref 70–99)
HCT VFR BLD AUTO: 35.8 % (ref 37–53)
HGB BLD-MCNC: 11.4 G/DL (ref 13.5–17.5)
IMM GRANULOCYTES # BLD AUTO: 0.03 K/MM3 (ref 0–0.1)
IMM GRANULOCYTES NFR BLD AUTO: 0 % (ref 0–1)
LYMPHOCYTES # BLD AUTO: 1.84 K/MM3 (ref 0.84–5.2)
LYMPHOCYTES NFR BLD AUTO: 21 % (ref 21–46)
MAGNESIUM SERPL-MCNC: 2.6 MG/DL (ref 1.6–2.4)
MCHC RBC AUTO-ENTMCNC: 31.8 G/DL (ref 31.5–36.5)
MCV RBC AUTO: 96 FL (ref 80–100)
MONOCYTES # BLD AUTO: 1.57 K/MM3 (ref 0.16–1.47)
MONOCYTES NFR BLD AUTO: 18 % (ref 4–13)
NEUTROPHILS # BLD AUTO: 5.14 K/MM3 (ref 1.96–9.15)
NEUTROPHILS NFR BLD AUTO: 59 % (ref 41–73)
NRBC # BLD AUTO: 0.03 K/MM3 (ref 0–0.02)
NRBC BLD AUTO-RTO: 0.3 /100 WBC (ref 0–0.2)
PHOSPHATE SERPL-MCNC: 4.5 MG/DL (ref 2.5–4.9)
PLATELET # BLD AUTO: 182 K/MM3 (ref 150–400)
POTASSIUM SERPL-SCNC: 4 MMOL/L (ref 3.5–5.5)
PROT SERPL-MCNC: 5.6 G/DL (ref 6.4–8.2)
SODIUM SERPL-SCNC: 140 MMOL/L (ref 136–145)
TROPONIN I SERPL-MCNC: 0.06 NG/ML (ref 0–0.04)

## 2020-12-20 NOTE — NUR
SHIFT SUMMARY
PT IS AOX4; CALLS APPROPRIATELY. USES 1L OF O2; PT ON TELE PACED @120.
THIS NURSE CALLED  THIS AM ABOUT THE BP OF THIS PT THAT IS BELOW 100 SBP. PT
WAS STILL GIVEN LASIX THIS AM AND I HELD THE IMDUR PER  ORDER.  ORDERED ME
TO HOLD THE AMIODARONE AND GIVE THE METOPROLOL LATER; RECHECKED THE BP IN THE
AFTERNOON, AND I WAS ABLE TO ADMINISTERED THOSE MEDICATIONS WITHIN THE
PARAMETERS. I WILL NOTIFY THE UPCOMING RN TO GIVE THE METOPROLOL DEPENDING ON
THE PT BP AT MIDNIGHT PER CHARGE RN. PT STATED DIZZINESS AT TIMES, BUT DENIED
CP, N&V. PT POSSIBLE DC TOMORROW. BED IS IN THE LOWEST POSITION AND CALL
LIGHTS WITHIN REACH

## 2020-12-20 NOTE — NUR
SHIFT SUMMARY-
PT. A&O, PLEASANT, AND COOPERATIVE WITH CARE. HR IN THE TEENS TO 'S
W/AFIB AND BBB LAST NIGHT. INSTRUCTED BY PHYSICIAN TO GIVE 1X DOSE OF COREG
(SEE NURSE NOTE). PT. HAD NO COMPLAINTS, ASYMPTOMATIC. PT. SLEPT ON/OFF
DURING THE NIGHT, NO APPARENT DISTRESS NOTED. REMAINS ON 1L OF 02 VIA NC, VSS.
DENIED ANY NEEDS T/O THE NIGHT. CALL LIGHT WITHIN REACH AND SIDE RAILS UPX2.
WILL CONT TO MONITOR.

## 2020-12-21 LAB
ALBUMIN SERPL BCP-MCNC: 2.7 G/DL (ref 3.4–5)
ALBUMIN/GLOB SERPL: 0.8 {RATIO} (ref 0.8–1.8)
ALT SERPL W P-5'-P-CCNC: 274 U/L (ref 12–78)
ANION GAP SERPL CALCULATED.4IONS-SCNC: 10 MMOL/L (ref 6–16)
AST SERPL W P-5'-P-CCNC: 113 U/L (ref 12–37)
BASOPHILS # BLD AUTO: 0.05 K/MM3 (ref 0–0.23)
BASOPHILS NFR BLD AUTO: 1 % (ref 0–2)
BILIRUB SERPL-MCNC: 1.5 MG/DL (ref 0.1–1)
BUN SERPL-MCNC: 59 MG/DL (ref 8–24)
CALCIUM SERPL-MCNC: 8.5 MG/DL (ref 8.5–10.1)
CHLORIDE SERPL-SCNC: 105 MMOL/L (ref 98–108)
CO2 SERPL-SCNC: 22 MMOL/L (ref 21–32)
CREAT SERPL-MCNC: 1.83 MG/DL (ref 0.6–1.2)
DEPRECATED RDW RBC AUTO: 53.1 FL (ref 35.1–46.3)
EOSINOPHIL # BLD AUTO: 0.16 K/MM3 (ref 0–0.68)
EOSINOPHIL NFR BLD AUTO: 2 % (ref 0–6)
ERYTHROCYTE [DISTWIDTH] IN BLOOD BY AUTOMATED COUNT: 15.6 % (ref 11.7–14.2)
GLOBULIN SER CALC-MCNC: 3.3 G/DL (ref 2.2–4)
GLUCOSE SERPL-MCNC: 98 MG/DL (ref 70–99)
HCT VFR BLD AUTO: 37.1 % (ref 37–53)
HGB BLD-MCNC: 11.9 G/DL (ref 13.5–17.5)
IMM GRANULOCYTES # BLD AUTO: 0.02 K/MM3 (ref 0–0.1)
IMM GRANULOCYTES NFR BLD AUTO: 0 % (ref 0–1)
LYMPHOCYTES # BLD AUTO: 1.29 K/MM3 (ref 0.84–5.2)
LYMPHOCYTES NFR BLD AUTO: 14 % (ref 21–46)
MCHC RBC AUTO-ENTMCNC: 32.1 G/DL (ref 31.5–36.5)
MCV RBC AUTO: 94 FL (ref 80–100)
MONOCYTES # BLD AUTO: 1.33 K/MM3 (ref 0.16–1.47)
MONOCYTES NFR BLD AUTO: 15 % (ref 4–13)
NEUTROPHILS # BLD AUTO: 6.3 K/MM3 (ref 1.96–9.15)
NEUTROPHILS NFR BLD AUTO: 69 % (ref 41–73)
NRBC # BLD AUTO: 0.03 K/MM3 (ref 0–0.02)
NRBC BLD AUTO-RTO: 0.3 /100 WBC (ref 0–0.2)
PLATELET # BLD AUTO: 195 K/MM3 (ref 150–400)
POTASSIUM SERPL-SCNC: 3.7 MMOL/L (ref 3.5–5.5)
PROT SERPL-MCNC: 6 G/DL (ref 6.4–8.2)
SODIUM SERPL-SCNC: 137 MMOL/L (ref 136–145)

## 2020-12-21 NOTE — NUR
RECHECKED BP JUST IN CASE RN NEEDED TO ADMINISTER THE 2100 METOPROLOL DOSE -
BP REMAINS LOW 90'S/60'S. WILL CONTINUE TO HOLD MED UNTIL 0300 VITAL CHECKS.
IF NEEDED, WILL ADMINISTER. IF NOT, WILL RETURN TO PYXIS. PT ALSO C/O SOME
SHORTNESS OF BREATH, NOT BEING ABLE TO REST. RN CALLED RESPIRATORY FOR PRN
ALBUTEROL TREATMENT. WILL CONTINUE TO MONITOR.

## 2020-12-21 NOTE — NUR
SHIFT SUMMARY
 
PT RESTED WELL THROUGH THE NIGHT. ALERT AND ORIENTED - ABLE TO MAKE NEEDS
KNOWN. SATS >92% ON 1LNC. TELE - VPACED, WITH AFIB. NO C/O CHEST PAIN, BUT DID
C/O OF SOME SHORTNESS OF BREATH AT ONE POINT, IN WHICH A BREATHING TX WAS
ADMINISTERED, WITH IMPROVEMENT AFTERWARDS. STAND BY ASSIST WITH AMBULATION.
VOIDING TO URINAL AT BEDSIDE, NO BM. PT DID C/O OF SOME ABD DISCOMFORT, NAUSEA
MEDS GIVEN PRN. BP CONTINUES TO REMAIN LOW - LOWEST READING 83/65, RECHECKED
1 HR LATER AND IT BUMPED UP TO 90'S/70'S, WHERE HE HAS BEEN RUNNING
FREQUENTLY. NO FEVER. NO C/O PAIN.
 
CALL LIGHT WITHIN REACH, BED IN LOWEST POSTIION. WILL CONTINUE TO MONITOR.

## 2020-12-21 NOTE — NUR
PT AWAKE DURING SHIFT REPORT, SITTING UP TO EOB. PT PULLING OFF GOWN, AFTER
SPILLING JUICE ON FLOOR. NEW GOWN PROVIDED. FLOOR CLEANED. PT ADMITTED FOR
CHF, WITH CARDIAC HX. PT NONCOMPLIANT AT HOME WITH MEDICATIONS, RUNNING OUT A
FEW DAYS BEFORE ADMIT. DR MORRIS IN TO SEE PT THIS AM. MEDICATIONS ADJUSTED AND
GIVEN. PT CLEAR FOR D/C. PT UP TO BTHRM WITH SBA FOR SAFETY. DENIED FURTHER
NEEDS. D/C ORDERS PLACED. D/[facility] MED[facility] FOR [facility]. D/C INSTRUCTIONS
REVIEWED WITH PT; VERBALIZED UNDERSTANDING. PT ABLE TO DRESS HIMSELF. ASSISTED
TO CAR VIA W/C BY CNA.

## 2023-07-24 NOTE — NUR
END OF SHIFT SUMMARY
 
PT ADMITTED TO PCU AT 0010. PT CAME VIA STRETCHER FROM THE ED AND AMBULATED
TO PCU BED INDEPENDENTLY.  PT A&O X4. SP02 >92% ON RA. PT HAS AICD ON LEFT
CHEST WALL. TELEMETRY REPORTS 100% VENTRICULARLY PACED, HR 70'S. DENIES CHEST
PAIN/PRESSURE. BLOOD PRESSURE LOW, SEE PREVIOUS NOTE. BP NOW IMPROVED. CALL
LIGHT IN REACH. WILL CONTINUE TO MONITOR UNTIL END OF SHIFT. Yes